# Patient Record
Sex: FEMALE | Race: WHITE | Employment: UNEMPLOYED | URBAN - METROPOLITAN AREA
[De-identification: names, ages, dates, MRNs, and addresses within clinical notes are randomized per-mention and may not be internally consistent; named-entity substitution may affect disease eponyms.]

---

## 2017-01-05 ENCOUNTER — TELEPHONE (OUTPATIENT)
Dept: INTERNAL MEDICINE CLINIC | Age: 57
End: 2017-01-05

## 2017-01-05 RX ORDER — AZITHROMYCIN 250 MG/1
250 TABLET, FILM COATED ORAL SEE ADMIN INSTRUCTIONS
Qty: 6 TAB | Refills: 0 | Status: SHIPPED | OUTPATIENT
Start: 2017-01-05 | End: 2017-01-10

## 2017-01-05 NOTE — TELEPHONE ENCOUNTER
Spoke with pt - states has hx of pneumonia. Seen at [facility] on 12/30/16 - CXR done - dx bronchitis and given Rx. She states she continues with \"dry cough, congestion and had fever 100.3 this am.\" No fever now. States given Omnicef - but does not want to take - wants Dr Nelson Weber to prescribe something for her.  Will forward to MD.

## 2017-01-17 ENCOUNTER — HOSPITAL ENCOUNTER (OUTPATIENT)
Dept: LAB | Age: 57
Discharge: HOME | End: 2017-01-17
Payer: MEDICARE

## 2017-01-17 ENCOUNTER — OFFICE VISIT (OUTPATIENT)
Dept: INTERNAL MEDICINE CLINIC | Age: 57
End: 2017-01-17

## 2017-01-17 VITALS
HEART RATE: 65 BPM | TEMPERATURE: 97.4 F | RESPIRATION RATE: 18 BRPM | SYSTOLIC BLOOD PRESSURE: 110 MMHG | OXYGEN SATURATION: 100 % | BODY MASS INDEX: 24.14 KG/M2 | WEIGHT: 168.6 LBS | DIASTOLIC BLOOD PRESSURE: 80 MMHG | HEIGHT: 70 IN

## 2017-01-17 DIAGNOSIS — J32.9 REFRACTORY SINUSITIS: Primary | ICD-10-CM

## 2017-01-17 DIAGNOSIS — J41.1 PURULENT BRONCHITIS (HCC): ICD-10-CM

## 2017-01-17 PROCEDURE — 87077 CULTURE AEROBIC IDENTIFY: CPT

## 2017-01-17 PROCEDURE — 87070 CULTURE OTHR SPECIMN AEROBIC: CPT

## 2017-01-17 RX ORDER — PREDNISONE 10 MG/1
TABLET ORAL
Qty: 25 TAB | Refills: 0 | Status: SHIPPED | OUTPATIENT
Start: 2017-01-17 | End: 2017-03-07 | Stop reason: ALTCHOICE

## 2017-01-17 RX ORDER — CEFUROXIME AXETIL 500 MG/1
500 TABLET ORAL 2 TIMES DAILY
Qty: 42 TAB | Refills: 0 | Status: SHIPPED | OUTPATIENT
Start: 2017-01-17 | End: 2017-02-07

## 2017-01-17 NOTE — MR AVS SNAPSHOT
Visit Information Date & Time Provider Department Dept. Phone Encounter #  
 1/17/2017 11:15 AM Dixie Garcia MD [address] [address] Internal Medicine [phone] [id number] Follow-up Instructions Return in about 3 weeks (around 2/7/2017). Upcoming Health Maintenance Date Due DTaP/Tdap/Td series (1 - Tdap) 2/14/1982 PAP AKA CERVICAL CYTOLOGY 2/14/1982 BREAST CANCER SCRN MAMMOGRAM 10/11/2018 COLONOSCOPY 11/14/2021 Allergies as of 1/17/2017  Review Complete On: 1/17/2017 By: Dixie Garcia MD  
  
 Severity Noted Reaction Type Reactions Aspirin High 07/22/2010    Other (comments) Has had gastric bypass, and three brain surgeries. Ibuprofen High 07/22/2010    Other (comments) Gastric bypass surgeon advised not to take . Hydrocodone  07/22/2010    Other (comments) Not allergic Percocet [Oxycodone-acetaminophen]  07/26/2010    Other (comments) Not allergic Current Immunizations  Reviewed on 11/9/2016 Name Date Influenza Nasal Vaccine 10/3/2015 Influenza Vaccine 9/9/2016, 9/30/2015, 10/31/2013 Pneumococcal Conjugate (PCV-13) 11/8/2016 Pneumococcal Polysaccharide (PPSV-23) 10/31/2015 Not reviewed this visit You Were Diagnosed With   
  
 Codes Comments Refractory sinusitis    -  Primary ICD-10-CM: J32.9 ICD-9-CM: 473.9 Purulent [facility] ([facility]     ICD-10-CM: J41.1 ICD-9-CM: 491.1 Vitals BP Pulse Temp Resp Height(growth percentile) Weight(growth percentile) 110/80 (BP 1 Location: Right arm, BP Patient Position: Sitting) 65 97.4 °F (36.3 °C) (Oral) 18 5' 10\" (1.778 m) 168 lb 9.6 oz (76.5 kg) SpO2 BMI OB Status Smoking Status 100% 24.19 kg/m2 Hysterectomy Current Every Day Smoker BMI and BSA Data Body Mass Index Body Surface Area  
 24.19 kg/m 2 1.94 m 2 Preferred Pharmacy Pharmacy Name Phone  [facility] [address] [address] [phone] Your Updated Medication List  
  
   
This list is accurate as of: 1/17/17 12:13 PM.  Always use your most recent med list.  
  
  
  
  
 albuterol 90 mcg/actuation inhaler Commonly known as:  PROVENTIL HFA, VENTOLIN HFA, PROAIR HFA Take 2 Puffs by inhalation every six (6) hours as needed for Wheezing. buPROPion 75 mg tablet Commonly known as:  [facility] Take  by mouth two (2) times a day. butalbital-acetaminophen-caffeine -40 mg per tablet Commonly known as:  FIORICET, ESGIC  
TAKE 1 TABLET BY MOUTH EVERY 4 HOURS AS NEEDED FOR PAIN  
  
 cefUROXime 500 mg tablet Commonly known as:  CEFTIN Take 1 Tab by mouth two (2) times a day for 21 days. clonazePAM 1 mg tablet Commonly known as:  Natalie Figueroa Take 1 mg by mouth two (2) times a day. divalproex  mg tablet Commonly known as:  DEPAKOTE Take 1 Tab by mouth four (4) times daily. FLUoxetine 40 mg capsule Commonly known as:  PROzac Take 80 mg by mouth daily. HYDROcodone-acetaminophen 5-325 mg per tablet Commonly known as:  Sandi Arts Take 1 Tab by mouth every four (4) hours as needed for Pain. Max Daily Amount: 6 Tabs. meloxicam 15 mg tablet Commonly known as:  MOBIC Take 1 tablet by mouth  daily. Replaces Ibuprofen. multivitamin tablet Commonly known as:  ONE A DAY Take 1 Tab by mouth daily. predniSONE 10 mg tablet Commonly known as:  Klever Faustinor See attached PROBIOTIC BLEND PO Take 1 Cap by mouth daily. promethazine 25 mg tablet Commonly known as:  PHENERGAN Take 1 tablet by mouth  every 8 hours as needed for nausea SEROquel 300 mg tablet Generic drug:  QUEtiapine Take 300 mg by mouth nightly. Prescriptions Sent to Pharmacy Refills  
 cefUROXime (CEFTIN) 500 mg tablet 0 Sig: Take 1 Tab by mouth two (2) times a day for 21 days.   
 Class: Normal  
 [facility]: [id number] [address] Ph #: [phone] Route: Oral  
 predniSONE (DELTASONE) 10 mg tablet 0 Sig: See attached Class: Normal  
 Pharmacy: [id number] [address] Ph #: [phone] We Performed the Following CULTURE, RESPIRATORY/SPUTUM/BRONCH Yamilka Shall STAIN [43687 CPT(R)] Follow-up Instructions Return in about 3 weeks (around 2/7/2017). Introducing [facility]! Luisa Haynes introduces [employer] patient portal. Now you can access parts of your medical record, email your doctor's office, and request medication refills online. 1. In your internet browser, go to [email or web address] 2. Click on the First Time User? Click Here link in the Sign In box. You will see the New Member Sign Up page. 3. Enter your Hello Music Access Code exactly as it appears below. You will not need to use this code after youve completed the sign-up process. If you do not sign up before the expiration date, you must request a new code. · Hello Music Access Code: [email or web address] Expires: 3/28/2017  8:32 AM 
 
4. Enter the last four digits of your Social Security Number (xxxx) and Date of Birth (mm/dd/yyyy) as indicated and click Submit. You will be taken to the next sign-up page. 5. Create a RADSONEt ID. This will be your Hello Music login ID and cannot be changed, so think of one that is secure and easy to remember. 6. Create a Hello Music password. You can change your password at any time. 7. Enter your Password Reset Question and Answer. This can be used at a later time if you forget your password. 8. Enter your e-mail address. You will receive e-mail notification when new information is available in [address]. 9. Click Sign Up. You can now view and download portions of your medical record.  
10. Click the Download Summary menu link to download a portable copy of your medical information. If you have questions, please visit the Frequently Asked Questions section of the Attensa website. Remember, Attensa is NOT to be used for urgent needs. For medical emergencies, dial 911. Now available from your iPhone and Android! Please provide this summary of care documentation to your next provider. Your primary care clinician is listed as STACEY LOUIS. If you have any questions after today's visit, please call [phone].

## 2017-01-18 NOTE — PROGRESS NOTES
HISTORY OF PRESENT ILLNESS  Chace Pierce is a 55 y.o. female. URI    The history is provided by the patient and parent (went to urgent care, notes reviewed). This is a new problem. The current episode started more than 1 week ago (had pneumonia in October, feels she has never quite resolved with her sx. repeat cxr shows resolved pneumonia). The problem has not changed since onset. There has been no fever. Associated symptoms include congestion, ear pain, plugged ear sensation, sore throat, cough and wheezing. Pertinent negatives include no abdominal pain, no diarrhea, no nausea and no vomiting. Treatments tried: omnicef. The treatment provided no relief. Review of Systems   HENT: Positive for congestion, ear pain and sore throat. Respiratory: Positive for cough and wheezing. Gastrointestinal: Negative for abdominal pain, diarrhea, nausea and vomiting. Physical Exam   Constitutional: No distress. HENT:   Right Ear: Tympanic membrane and ear canal normal.   Left Ear: Tympanic membrane and ear canal normal.   Nose: Right sinus exhibits no maxillary sinus tenderness and no frontal sinus tenderness. Left sinus exhibits no maxillary sinus tenderness and no frontal sinus tenderness. Mouth/Throat: Posterior oropharyngeal edema and posterior oropharyngeal erythema present. No oropharyngeal exudate. Eyes: Conjunctivae are normal.   Neck: Neck supple. Cardiovascular: Normal rate and regular rhythm. Exam reveals no gallop and no friction rub. No murmur heard. Pulmonary/Chest: Effort normal. She has wheezes. mild   Lymphadenopathy:     She has no cervical adenopathy. Nursing note and vitals reviewed. ASSESSMENT and PLAN  Kurt Fernandez was seen today for cough, ear pain and nasal congestion. Diagnoses and all orders for this visit:    Refractory sinusitis  -     cefUROXime (CEFTIN) 500 mg tablet; Take 1 Tab by mouth two (2) times a day for 21 days.   -     predniSONE (DELTASONE) 10 mg tablet; See attached- Reviewed side effects, goal of treatment and need for follow up  . Specifically warned against worsened psych sx, call asap if noted  -     CULTURE, RESPIRATORY/SPUTUM/BRONCH W GRAM STAIN   - consider ENT consult if sx persist    Purulent bronchitis (HCC)  -     cefUROXime (CEFTIN) 500 mg tablet; Take 1 Tab by mouth two (2) times a day for 21 days.   -     predniSONE (DELTASONE) 10 mg tablet; See attached  -     CULTURE, RESPIRATORY/SPUTUM/BRONCH [address] was reviewed with patient and family, understanding expressed

## 2017-01-21 LAB
BACTERIA SPT CULT: ABNORMAL
BACTERIA SPT CULT: ABNORMAL

## 2017-03-07 ENCOUNTER — OFFICE VISIT (OUTPATIENT)
Dept: INTERNAL MEDICINE CLINIC | Age: 57
End: 2017-03-07

## 2017-03-07 VITALS
HEIGHT: 70 IN | WEIGHT: 169.4 LBS | SYSTOLIC BLOOD PRESSURE: 110 MMHG | HEART RATE: 82 BPM | BODY MASS INDEX: 24.25 KG/M2 | RESPIRATION RATE: 16 BRPM | TEMPERATURE: 98.1 F | DIASTOLIC BLOOD PRESSURE: 80 MMHG | OXYGEN SATURATION: 96 %

## 2017-03-07 DIAGNOSIS — Z23 NEED FOR TDAP VACCINATION: ICD-10-CM

## 2017-03-07 DIAGNOSIS — J32.4 CHRONIC PANSINUSITIS: Primary | ICD-10-CM

## 2017-05-16 RX ORDER — PROMETHAZINE HYDROCHLORIDE 25 MG/1
TABLET ORAL
Qty: 270 TAB | Refills: 3 | Status: SHIPPED | COMMUNITY
Start: 2017-05-16 | End: 2018-04-13 | Stop reason: SDUPTHER

## 2017-05-18 RX ORDER — MELOXICAM 15 MG/1
TABLET ORAL
Qty: 90 TAB | Refills: 1 | Status: SHIPPED | COMMUNITY
Start: 2017-05-18 | End: 2017-09-30 | Stop reason: SDUPTHER

## 2017-06-06 ENCOUNTER — HOSPITAL ENCOUNTER (EMERGENCY)
Age: 57
Discharge: HOME | End: 2017-06-06
Attending: FAMILY MEDICINE

## 2017-06-06 ENCOUNTER — HOSPITAL ENCOUNTER (OUTPATIENT)
Dept: LAB | Age: 57
Discharge: HOME | End: 2017-06-06

## 2017-06-06 VITALS
BODY MASS INDEX: 25.05 KG/M2 | TEMPERATURE: 97.8 F | HEART RATE: 69 BPM | WEIGHT: 175 LBS | SYSTOLIC BLOOD PRESSURE: 129 MMHG | HEIGHT: 70 IN | DIASTOLIC BLOOD PRESSURE: 62 MMHG | OXYGEN SATURATION: 95 % | RESPIRATION RATE: 16 BRPM

## 2017-06-06 DIAGNOSIS — J02.9 PHARYNGITIS, UNSPECIFIED ETIOLOGY: Primary | ICD-10-CM

## 2017-06-06 LAB — S PYO AG THROAT QL: NEGATIVE

## 2017-06-06 PROCEDURE — 87070 CULTURE OTHR SPECIMN AEROBIC: CPT | Performed by: PHYSICIAN ASSISTANT

## 2017-06-06 RX ORDER — PREDNISONE 20 MG/1
20 TABLET ORAL DAILY
Qty: 10 TAB | Refills: 0 | Status: SHIPPED | OUTPATIENT
Start: 2017-06-06 | End: 2017-06-16

## 2017-06-06 RX ORDER — LIDOCAINE HYDROCHLORIDE 20 MG/ML
SOLUTION OROPHARYNGEAL
Qty: 1 BOTTLE | Refills: 0 | Status: SHIPPED | OUTPATIENT
Start: 2017-06-06 | End: 2017-09-01 | Stop reason: ALTCHOICE

## 2017-06-06 NOTE — DISCHARGE INSTRUCTIONS
Sore Throat: Care Instructions  Your Care Instructions    Infection by bacteria or a virus causes most sore throats. Cigarette smoke, dry air, air pollution, allergies, and yelling can also cause a sore throat. Sore throats can be painful and annoying. Fortunately, most sore throats go away on their own. If you have a bacterial infection, your doctor may prescribe antibiotics. Follow-up care is a key part of your treatment and safety. Be sure to make and go to all appointments, and call your doctor if you are having problems. It's also a good idea to know your test results and keep a list of the medicines you take. How can you care for yourself at home? · If your doctor prescribed antibiotics, take them as directed. Do not stop taking them just because you feel better. You need to take the full course of antibiotics. · Gargle with warm salt water once an hour to help reduce swelling and relieve discomfort. Use 1 teaspoon of salt mixed in 1 cup of warm water. · Take an over-the-counter pain medicine, such as acetaminophen (Tylenol), ibuprofen (Advil, Motrin), or naproxen (Aleve). Read and follow all instructions on the label. · Be careful when taking over-the-counter cold or flu medicines and Tylenol at the same time. Many of these medicines have acetaminophen, which is Tylenol. Read the labels to make sure that you are not taking more than the recommended dose. Too much acetaminophen (Tylenol) can be harmful. · Drink plenty of fluids. Fluids may help soothe an irritated throat. Hot fluids, such as tea or soup, may help decrease throat pain. · Use over-the-counter throat lozenges to soothe pain. Regular cough drops or hard candy may also help. These should not be given to young children because of the risk of choking. · Do not smoke or allow others to smoke around you. If you need help quitting, talk to your doctor about stop-smoking programs and medicines.  These can increase your chances of quitting for good. · Use a vaporizer or humidifier to add moisture to your bedroom. Follow the directions for cleaning the machine. When should you call for help? Call your doctor now or seek immediate medical care if:  · You have new or worse trouble swallowing. · Your sore throat gets much worse on one side. Watch closely for changes in your health, and be sure to contact your doctor if you do not get better as expected. Where can you learn more? Go to http://jorje-mila.info/. Enter [phone] in the search box to learn more about \"Sore Throat: Care Instructions. \"  Current as of: July 29, 2016  Content Version: 11.2  © 6385-6612 [employer]. Care instructions adapted under license by [employer] (which disclaims liability or warranty for this information). If you have questions about a medical condition or this instruction, always ask your healthcare professional. [address] any warranty or liability for your use of this information.

## 2017-06-07 NOTE — UC PROVIDER NOTE
Patient is a 65 y.o. female presenting with sore throat. The history is provided by the patient. Sore Throat    This is a new problem. The current episode started yesterday. The problem has not changed since onset. There has been no fever. Pertinent negatives include no diarrhea, no vomiting, no congestion, no drooling, no ear discharge, no ear pain, no headaches, no shortness of breath, no stridor and no swollen glands. She has had no exposure to strep or mono. She has tried nothing for the symptoms. Past Medical History:   Diagnosis Date    Anxiety disorder     Brain aneurysm 2/12/2010    Headache 2/12/2010    Hyperlipidemia 2/12/2010    Insomnia 2/12/2010    LFT'S ABNORMAL 2/12/2010    Neurological disorder     Brain anuruysm    Screen for colon cancer 11/13/2014    Seizures ([address]         Past Surgical History:   Procedure Laterality Date    HX CHOLECYSTECTOMY      HX COLONOSCOPY  2015    due 26    HX CRANIOTOMY      And cerebral shunt, by Dr. Cricket Nayak. Followed by Dr. Roni Chavez.  HX HERNIA REPAIR      HX PARTIAL HYSTERECTOMY           Family History   Problem Relation Age of Onset    Diabetes Mother     Cancer Father     Cancer Maternal Grandmother     Heart Disease Maternal Grandfather         Social History     Social History    Marital status:      Spouse name: N/A    Number of children: N/A    Years of education: N/A     Occupational History    Not on file. Social History Main Topics    Smoking status: Current Every Day Smoker     Years: 30.00     Types: Cigarettes    Smokeless tobacco: Not on file      Comment: a few cigarettes daily    Alcohol use No    Drug use: No    Sexual activity: Not on file     Other Topics Concern    Not on file     Social History Narrative                ALLERGIES: Aspirin; Ibuprofen; Hydrocodone; and Percocet [oxycodone-acetaminophen]    Review of Systems   Constitutional: Negative for chills and fever.    HENT: Positive for sore throat. Negative for congestion, drooling, ear discharge and ear pain. Respiratory: Negative for shortness of breath and stridor. Gastrointestinal: Negative for diarrhea and vomiting. Neurological: Negative for headaches. Vitals:    06/06/17 1407   BP: 129/62   Pulse: 69   Resp: 16   Temp: 97.8 °F (36.6 °C)   SpO2: 95%   Weight: 79.4 kg (175 lb)   Height: 5' 10\" (1.778 m)       Physical Exam   Constitutional: She is oriented to person, place, and time. She appears well-developed and well-nourished. HENT:   Right Ear: External ear normal.   Left Ear: External ear normal.   Mouth/Throat: Oropharynx is clear and moist.   Eyes: Conjunctivae and EOM are normal.   Cardiovascular: Normal rate, regular rhythm and normal heart sounds. Pulmonary/Chest: Effort normal and breath sounds normal.   Neurological: She is alert and oriented to person, place, and time. Skin: Skin is warm and dry. Psychiatric: She has a normal mood and affect. Her behavior is normal. Judgment and thought content normal.   Nursing note and vitals reviewed. MDM     Differential Diagnosis; Clinical Impression; Plan:     CLINICAL IMPRESSION:  Pharyngitis, unspecified etiology  (primary encounter diagnosis)    Plan:  1. Lidocaine viscous  2. prednisone  3. Amount and/or Complexity of Data Reviewed:   Clinical lab tests:  Ordered and reviewed  Risk of Significant Complications, Morbidity, and/or Mortality:   Presenting problems: Moderate  Diagnostic procedures: Moderate  Management options:   Moderate  Progress:   Patient progress:  Stable      Procedures

## 2017-06-08 LAB
BACTERIA SPEC CULT: NORMAL
SERVICE CMNT-IMP: NORMAL

## 2017-08-31 ENCOUNTER — TELEPHONE (OUTPATIENT)
Dept: INTERNAL MEDICINE CLINIC | Age: 57
End: 2017-08-31

## 2017-08-31 NOTE — TELEPHONE ENCOUNTER
Spoke with pt - states she was given prednisone at Urgent Care for cold/sore throat symptoms. She states had a reaction to med. Her reaction was she could not walk or talk after one dose. No other reaction such as rash or sob. Asked her when she was given this - states few weeks ago. While reviewing chart - pt was seen back on 6/6/17. Advised her I would let MD know. Will forward to MD.    Pt states she got her flu shot 2 days ago at pharmacy.

## 2017-08-31 NOTE — TELEPHONE ENCOUNTER
The patient thinks she had a reaction to Prednisone and does not want to ever take the medication again also she would like to see if she should get her flu shot soon.  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

## 2017-09-01 ENCOUNTER — OFFICE VISIT (OUTPATIENT)
Dept: INTERNAL MEDICINE CLINIC | Age: 57
End: 2017-09-01

## 2017-09-01 ENCOUNTER — HOSPITAL ENCOUNTER (OUTPATIENT)
Dept: LAB | Age: 57
Discharge: HOME | End: 2017-09-01
Payer: MEDICARE

## 2017-09-01 VITALS
SYSTOLIC BLOOD PRESSURE: 108 MMHG | HEART RATE: 70 BPM | TEMPERATURE: 97.7 F | OXYGEN SATURATION: 94 % | BODY MASS INDEX: 23.22 KG/M2 | HEIGHT: 70 IN | RESPIRATION RATE: 16 BRPM | DIASTOLIC BLOOD PRESSURE: 65 MMHG | WEIGHT: 162.2 LBS

## 2017-09-01 DIAGNOSIS — Z13.0 SCREENING FOR ENDOCRINE, METABOLIC, AND IMMUNITY DISORDER: ICD-10-CM

## 2017-09-01 DIAGNOSIS — G44.229 CHRONIC TENSION-TYPE HEADACHE, NOT INTRACTABLE: Primary | ICD-10-CM

## 2017-09-01 DIAGNOSIS — F33.42 RECURRENT MAJOR DEPRESSIVE DISORDER, IN FULL REMISSION (HCC): ICD-10-CM

## 2017-09-01 DIAGNOSIS — G40.909 SEIZURE DISORDER (HCC): ICD-10-CM

## 2017-09-01 DIAGNOSIS — Z13.228 SCREENING FOR ENDOCRINE, METABOLIC, AND IMMUNITY DISORDER: ICD-10-CM

## 2017-09-01 DIAGNOSIS — Z13.29 SCREENING FOR ENDOCRINE, METABOLIC, AND IMMUNITY DISORDER: ICD-10-CM

## 2017-09-01 DIAGNOSIS — E55.9 VITAMIN D DEFICIENCY: ICD-10-CM

## 2017-09-01 PROCEDURE — 82550 ASSAY OF CK (CPK): CPT

## 2017-09-01 PROCEDURE — 82306 VITAMIN D 25 HYDROXY: CPT

## 2017-09-01 PROCEDURE — 80053 COMPREHEN METABOLIC PANEL: CPT

## 2017-09-01 PROCEDURE — 85025 COMPLETE CBC W/AUTO DIFF WBC: CPT

## 2017-09-01 PROCEDURE — 84443 ASSAY THYROID STIM HORMONE: CPT

## 2017-09-01 PROCEDURE — 82977 ASSAY OF GGT: CPT

## 2017-09-01 PROCEDURE — 36415 COLL VENOUS BLD VENIPUNCTURE: CPT

## 2017-09-01 PROCEDURE — 86787 VARICELLA-ZOSTER ANTIBODY: CPT

## 2017-09-01 NOTE — PROGRESS NOTES
Chief Complaint   Patient presents with    Head Pain     1. Have you been to the ER, urgent care clinic since your last visit? Hospitalized since your last visit? No    2. Have you seen or consulted any other health care providers outside of the [address] [place] [address] since your last visit? Include any pap smears or colon screening.  No

## 2017-09-01 NOTE — MR AVS SNAPSHOT
Visit Information Date & Time Provider Department Dept. Phone Encounter #  
 9/1/2017  1:30 PM Nora Andrade, [address] [place] [address] Internal Medicine [phone] [id number] Upcoming Health Maintenance Date Due DTaP/Tdap/Td series (1 - Tdap) 2/14/1982 BREAST CANCER SCRN MAMMOGRAM 10/11/2018 PAP AKA CERVICAL CYTOLOGY 3/7/2020 COLONOSCOPY 11/14/2021 Allergies as of 9/1/2017  Review Complete On: 9/1/2017 By: Nora Andrade MD  
  
 Severity Noted Reaction Type Reactions Aspirin High 07/22/2010    Other (comments) Has had gastric bypass, and three brain surgeries. Ibuprofen High 07/22/2010    Other (comments) Gastric bypass surgeon advised not to take . Hydrocodone  07/22/2010    Other (comments) Not allergic Percocet [Oxycodone-acetaminophen]  07/26/2010    Other (comments) Not allergic Prednisone  09/01/2017    Other (comments) Equilibrium off Current Immunizations  Reviewed on 8/28/2017 Name Date Influenza Nasal Vaccine 10/3/2015 Influenza Vaccine 8/25/2017, 9/9/2016, 9/30/2015, 10/31/2013 Pneumococcal Conjugate (PCV-13) 11/8/2016 Pneumococcal Polysaccharide (PPSV-23) 10/31/2015 Not reviewed this visit You Were Diagnosed With   
  
 Codes Comments Chronic tension-type headache, not intractable    -  Primary ICD-10-CM: I29.557 ICD-9-CM: 339.12 [facility] ([facility]     ICD-10-CM: G40.909 ICD-9-CM: 345.90 Recurrent major depressive disorder, in full remission ([facility]     ICD-10-CM: F33.42 
ICD-9-CM: 296.36 Vitamin D deficiency     ICD-10-CM: E55.9 ICD-9-CM: 268.9 Screening for endocrine, metabolic, and immunity disorder     ICD-10-CM: Z13.29, Z13.228, Z13.0 ICD-9-CM: V77.99 Vitals BP Pulse Temp Resp Height(growth percentile) Weight(growth percentile) 108/65 (BP 1 Location: Right arm, BP Patient Position: Sitting) 70 97.7 °F (36.5 °C) (Oral) 16 5' 10\" (1.778 m) 162 lb 3.2 oz (73.6 kg) SpO2 BMI OB Status Smoking Status 94% 23.27 kg/m2 Hysterectomy Current Every Day Smoker Vitals History BMI and BSA Data Body Mass Index Body Surface Area  
 23.27 kg/m 2 1.91 m 2 Preferred Pharmacy Pharmacy Name Phone [facility] [phone] Your Updated Medication List  
  
   
This list is accurate as of: 9/1/17  2:11 PM.  Always use your most recent med list.  
  
  
  
  
 buPROPion 75 mg tablet Commonly known as:  [facility] Take  by mouth two (2) times a day. butalbital-acetaminophen-caffeine -40 mg per tablet Commonly known as:  FIORICET, ESGIC  
TAKE 1 TABLET BY MOUTH EVERY 4 HOURS AS NEEDED FOR PAIN  
  
 clonazePAM 1 mg tablet Commonly known as:  Terrence Ben Take 1 mg by mouth two (2) times a day. divalproex  mg tablet Commonly known as:  DEPAKOTE Take 1 Tab by mouth four (4) times daily. FLUoxetine 40 mg capsule Commonly known as:  PROzac Take 80 mg by mouth daily. meloxicam 15 mg tablet Commonly known as:  MOBIC  
TAKE 1 TABLET BY MOUTH  DAILY (REPLACES IBUPROFEN)  
  
 multivitamin tablet Commonly known as:  ONE A DAY Take 1 Tab by mouth daily. PROBIOTIC BLEND PO Take 1 Cap by mouth daily. promethazine 25 mg tablet Commonly known as:  PHENERGAN Take 1 tablet by mouth  every 8 hours as needed for nausea SEROquel 300 mg tablet Generic drug:  QUEtiapine Take 400 mg by mouth nightly. We Performed the Following CBC WITH AUTOMATED DIFF [33508 CPT(R)] METABOLIC PANEL, COMPREHENSIVE [78881 CPT(R)] TSH 3RD GENERATION [56731 CPT(R)] VITAMIN D, 25 HYDROXY [id number] CPT(R)] VZV AB, IGG [id number] CPT(R)] Introducing [facility]! Ofelia Rowland introduces [employer] patient portal. Now you can access parts of your medical record, email your doctor's office, and request medication refills online. 1. In your internet browser, go to [email or web address] 2. Click on the First Time User? Click Here link in the Sign In box. You will see the New Member Sign Up page. 3. Enter your BorderJump Access Code exactly as it appears below. You will not need to use this code after youve completed the sign-up process. If you do not sign up before the expiration date, you must request a new code. · BorderJump Access Code: [email or web address] Expires: 9/4/2017  2:30 PM 
 
4. Enter the last four digits of your Social Security Number (xxxx) and Date of Birth (mm/dd/yyyy) as indicated and click Submit. You will be taken to the next sign-up page. 5. Create a Nitrot ID. This will be your BorderJump login ID and cannot be changed, so think of one that is secure and easy to remember. 6. Create a BorderJump password. You can change your password at any time. 7. Enter your Password Reset Question and Answer. This can be used at a later time if you forget your password. 8. Enter your e-mail address. You will receive e-mail notification when new information is available in [address] 9. Click Sign Up. You can now view and download portions of your medical record. 10. Click the Download Summary menu link to download a portable copy of your medical information. If you have questions, please visit the Frequently Asked Questions section of the BorderJump website. Remember, BorderJump is NOT to be used for urgent needs. For medical emergencies, dial 911. Now available from your iPhone and Android! Please provide this summary of care documentation to your next provider. Your primary care clinician is listed as STACEY LOUIS. If you have any questions after today's visit, please call [phone].

## 2017-09-01 NOTE — PROGRESS NOTES
HISTORY OF PRESENT ILLNESS  Ruben Quigley is a 65 y.o. female. ROLA Neftali Mathur is seen today for routine follow up of anemia and other chronic problems. Seizure disorder with history of intracerebral hemorrhage. She is up to date with specialist follow up, clinically stable. Vitamin D deficiency, anemia. Due for routine labs. Anxiety. Stable with psychiatry follow up. Chronic nausea. She uses Zofran periodically with good results. Preventive medicine. She had declined Zostavax secondary to this expense. MedDATA/gwo     Past Medical History:   Diagnosis Date    Anxiety disorder     Brain aneurysm 2/12/2010    Headache 2/12/2010    Hyperlipidemia 2/12/2010    Insomnia 2/12/2010    LFT'S ABNORMAL 2/12/2010    Neurological disorder     Brain anuruysm    Screen for colon cancer 11/13/2014    Seizures ([facility]          Review of Systems   Constitutional: Negative for weight loss. Respiratory: Negative. Cardiovascular: Negative for chest pain, palpitations, leg swelling and PND. Musculoskeletal: Negative for myalgias. Neurological: Positive for speech change. Negative for focal weakness and seizures. Psychiatric/Behavioral: Positive for depression. The patient is nervous/anxious. Physical Exam   Constitutional: She appears well-nourished. Neck: Carotid bruit is not present. Cardiovascular: Normal rate and regular rhythm. Exam reveals no gallop and no friction rub. No murmur heard. Pulmonary/Chest: Effort normal and breath sounds normal. No respiratory distress. Musculoskeletal: She exhibits no edema. Nursing note and vitals reviewed. ASSESSMENT and PLAN  Diagnoses and all orders for this visit:    1. Chronic tension-type headache, not intractable- Continue current regimen of prescription and / or OTC medications , See neurologist as directed     2. Seizure disorder ([facility]  -     CBC WITH AUTOMATED DIFF  -     METABOLIC PANEL, COMPREHENSIVE    3.  Recurrent major depressive disorder, in full remission ([facility]  -     TSH 3RD GENERATION    4.  Vitamin D deficiency  -     VITAMIN D, 25 HYDROXY    5. Screening for endocrine, metabolic, and immunity disorder  -     VZV AB, IGG

## 2017-09-02 LAB
25(OH)D3+25(OH)D2 SERPL-MCNC: 20.1 NG/ML (ref 30–100)
ALBUMIN SERPL-MCNC: 4.5 G/DL (ref 3.5–5.5)
ALBUMIN/GLOB SERPL: 1.5 {RATIO} (ref 1.2–2.2)
ALP SERPL-CCNC: 169 IU/L (ref 39–117)
ALT SERPL-CCNC: 22 IU/L (ref 0–32)
AST SERPL-CCNC: 47 IU/L (ref 0–40)
BASOPHILS # BLD AUTO: 0 X10E3/UL (ref 0–0.2)
BASOPHILS NFR BLD AUTO: 0 %
BILIRUB SERPL-MCNC: 0.5 MG/DL (ref 0–1.2)
BUN SERPL-MCNC: 16 MG/DL (ref 6–24)
BUN/CREAT SERPL: 18 (ref 9–23)
CALCIUM SERPL-MCNC: 9.6 MG/DL (ref 8.7–10.2)
CHLORIDE SERPL-SCNC: 95 MMOL/L (ref 96–106)
CO2 SERPL-SCNC: 27 MMOL/L (ref 18–29)
CREAT SERPL-MCNC: 0.87 MG/DL (ref 0.57–1)
EOSINOPHIL # BLD AUTO: 0.4 X10E3/UL (ref 0–0.4)
EOSINOPHIL NFR BLD AUTO: 3 %
ERYTHROCYTE [DISTWIDTH] IN BLOOD BY AUTOMATED COUNT: 15.1 % (ref 12.3–15.4)
GLOBULIN SER CALC-MCNC: 3 G/DL (ref 1.5–4.5)
GLUCOSE SERPL-MCNC: 68 MG/DL (ref 65–99)
HCT VFR BLD AUTO: 41.9 % (ref 34–46.6)
HGB BLD-MCNC: 13.3 G/DL (ref 11.1–15.9)
IMM GRANULOCYTES # BLD: 0 X10E3/UL (ref 0–0.1)
IMM GRANULOCYTES NFR BLD: 0 %
LYMPHOCYTES # BLD AUTO: 3.6 X10E3/UL (ref 0.7–3.1)
LYMPHOCYTES NFR BLD AUTO: 26 %
MCH RBC QN AUTO: 29.9 PG (ref 26.6–33)
MCHC RBC AUTO-ENTMCNC: 31.7 G/DL (ref 31.5–35.7)
MCV RBC AUTO: 94 FL (ref 79–97)
MONOCYTES # BLD AUTO: 1.1 X10E3/UL (ref 0.1–0.9)
MONOCYTES NFR BLD AUTO: 8 %
NEUTROPHILS # BLD AUTO: 8.6 X10E3/UL (ref 1.4–7)
NEUTROPHILS NFR BLD AUTO: 63 %
PLATELET # BLD AUTO: 239 X10E3/UL (ref 150–379)
POTASSIUM SERPL-SCNC: 4.2 MMOL/L (ref 3.5–5.2)
PROT SERPL-MCNC: 7.5 G/DL (ref 6–8.5)
RBC # BLD AUTO: 4.45 X10E6/UL (ref 3.77–5.28)
SODIUM SERPL-SCNC: 138 MMOL/L (ref 134–144)
TSH SERPL DL<=0.005 MIU/L-ACNC: 3.26 UIU/ML (ref 0.45–4.5)
VZV IGG SER IA-ACNC: 1180 INDEX
WBC # BLD AUTO: 13.8 X10E3/UL (ref 3.4–10.8)

## 2017-09-05 NOTE — PROGRESS NOTES
[facility] spoke with Misbah Yan - added on ggt and cpk - Dx - abnormal transaminase enzymes (R74.0).

## 2017-09-06 LAB
CK SERPL-CCNC: 676 U/L (ref 24–173)
GGT SERPL-CCNC: 605 IU/L (ref 0–60)
SPECIMEN STATUS REPORT, ROLRST: NORMAL

## 2017-09-09 NOTE — PROGRESS NOTES
Call pt- her lfts are abnormal. Which specialist prescribes her depakote ? Once you know please get them on the phone. Also order abdominal us, dx increase lfts  Lastly, Vitamin d is slightly low. Start vitamin d 2000 units every day, available over the counter.

## 2017-09-11 DIAGNOSIS — R79.89 ELEVATED LFTS: Primary | ICD-10-CM

## 2017-09-11 NOTE — PROGRESS NOTES
Advised pt her lfts are abnormal. MD recommends she get an abdominal US. Scheduled her tomorrow 9/12/17 at 10:45 am. Pt is aware of appt location and to arrive by 10:15 am.  Her vitamin d is slightly low. Need to start vitamin d 2000 units every day - available over the counter. Spoke with [employer] scheduling for appt time.

## 2017-09-11 NOTE — PROGRESS NOTES
Advised pt MD needed to know specialist that prescribes her Depakote? She states it is Dr Enoch Roman at [facility]. Called Dr Tate\"s office - spoke with Carolyn Griffiths - MD not available - she will send message to him to call Dr Loy Martinez.  Will forward to MD.

## 2017-09-12 ENCOUNTER — HOSPITAL ENCOUNTER (OUTPATIENT)
Dept: ULTRASOUND IMAGING | Age: 57
Discharge: HOME | End: 2017-09-12
Attending: INTERNAL MEDICINE
Payer: MEDICARE

## 2017-09-12 DIAGNOSIS — R79.89 ELEVATED LFTS: ICD-10-CM

## 2017-09-12 PROCEDURE — 76700 US EXAM ABDOM COMPLETE: CPT

## 2017-09-13 ENCOUNTER — TELEPHONE (OUTPATIENT)
Dept: INTERNAL MEDICINE CLINIC | Age: 57
End: 2017-09-13

## 2017-09-13 NOTE — TELEPHONE ENCOUNTER
Reviewed case, increase lft, ? Relating to depakote. Advised anam lab. If still abnormal change meds. If no Keppra in past, change to 1 gr bid, taper depakote over one month. If severe elevation accelerate taper.    He sees her in the near future

## 2017-09-13 NOTE — TELEPHONE ENCOUNTER
Pt states that when she had the [address] yesterday, the technician told her she has shingles. Can you please call her with the US results as well as what to do about the shingles. Is she contagious?

## 2017-09-13 NOTE — TELEPHONE ENCOUNTER
Spoke with pt - states while getting her US today the tech noticed \"3 red spots in middle of her abdomen. \" Pt states there was only one there yesterday. She states it is painful. Wants to know if she has shingles.  Advised her MD can not diag her without seeing her first. She scheduled for tomorrow 9/14/17 at 9:35 am. Will forward to MD.

## 2017-09-14 ENCOUNTER — OFFICE VISIT (OUTPATIENT)
Dept: INTERNAL MEDICINE CLINIC | Age: 57
End: 2017-09-14

## 2017-09-14 ENCOUNTER — HOSPITAL ENCOUNTER (OUTPATIENT)
Dept: LAB | Age: 57
Discharge: HOME | End: 2017-09-14
Payer: MEDICARE

## 2017-09-14 VITALS
SYSTOLIC BLOOD PRESSURE: 131 MMHG | HEIGHT: 70 IN | WEIGHT: 159.8 LBS | OXYGEN SATURATION: 97 % | HEART RATE: 78 BPM | RESPIRATION RATE: 16 BRPM | BODY MASS INDEX: 22.88 KG/M2 | DIASTOLIC BLOOD PRESSURE: 81 MMHG

## 2017-09-14 DIAGNOSIS — G40.909 SEIZURE DISORDER (HCC): Primary | ICD-10-CM

## 2017-09-14 DIAGNOSIS — R79.89 ABNORMAL LFTS: ICD-10-CM

## 2017-09-14 DIAGNOSIS — L08.9 BACTERIAL SKIN INFECTION OF TRUNK: ICD-10-CM

## 2017-09-14 DIAGNOSIS — T50.905A MEDICATION SIDE EFFECT, INITIAL ENCOUNTER: ICD-10-CM

## 2017-09-14 DIAGNOSIS — B96.89 BACTERIAL SKIN INFECTION OF TRUNK: ICD-10-CM

## 2017-09-14 PROCEDURE — 36415 COLL VENOUS BLD VENIPUNCTURE: CPT

## 2017-09-14 PROCEDURE — 82977 ASSAY OF GGT: CPT

## 2017-09-14 PROCEDURE — 80164 ASSAY DIPROPYLACETIC ACD TOT: CPT

## 2017-09-14 PROCEDURE — 80076 HEPATIC FUNCTION PANEL: CPT

## 2017-09-14 RX ORDER — MUPIROCIN 20 MG/G
OINTMENT TOPICAL DAILY
Qty: 22 G | Refills: 0 | Status: SHIPPED | OUTPATIENT
Start: 2017-09-14 | End: 2018-01-17 | Stop reason: ALTCHOICE

## 2017-09-14 NOTE — PROGRESS NOTES
HISTORY OF PRESENT ILLNESS  Aaron Early is a 65 y.o. female. HPI Sb Herrera is seen today accompanied by her  for evaluation of a skin rash and follow up of abnormal LFTs and seizure disorder. Rash. She is concerned about shingles. A rash was noted when she had her abdominal ultrasound. She really was not aware of the rash though it is painful. On exam, she has two isolated erythematous regions that do not appear like shingles. Reassured her these appeared to be superficial skin infections. She believes they have been present for three days. Abnormal LFTs. She tells me she has a past medical history of fatty liver prior to gastric bypass surgery. She has a significant elevated GGT in the 600s. See previous telephone note with her neurologist.  We will repeat her LFTs today. Her abdominal ultrasound was unremarkable. If LFTs remain abnormal, we will proceed with a change in her seizure medication with a follow up with neurology. Seizure disorder. As above. We will cross over to [address] if LFTs remain high. I have spoke with her neurologist and tapering instructions for Depakote were reviewed as well. Lastly, we will check a Depakote level today. MedDATA/gwo         Review of Systems   Constitutional: Negative for chills and fever. Gastrointestinal: Negative for abdominal pain. Skin: Positive for rash. Neurological: Negative for seizures. Physical Exam   Abdominal: Soft. She exhibits no distension. There is no hepatosplenomegaly. There is no tenderness. Skin: Rash noted. Rash is pustular. Rash is not vesicular. ASSESSMENT and PLAN  Diagnoses and all orders for this visit:    1. Seizure disorder (HCC)  -     VALPROIC ACID, see hpi    2. Bacterial skin infection of trunk  -     mupirocin (BACTROBAN) 2 % ointment; Apply  to affected area daily.     3. Abnormal LFTs  -     HEPATIC FUNCTION PANEL    4. Medication side effect, initial encounter  -     GGT    Plan was reviewed with patient and family, understanding expressed

## 2017-09-14 NOTE — MR AVS SNAPSHOT
Visit Information Date & Time Provider Department Dept. Phone Encounter #  
 9/14/2017  9:35 AM France Laguna MD [facility] [phone] [id number] Your Appointments 9/5/2018 10:00 AM  
COMPLETE PHYSICAL with France Laguna MD  
[facility]) Appt Note: cpe  
 [address] [facility] [address]  
[facility] [address] [id number] [facility] Upcoming Health Maintenance Date Due DTaP/Tdap/Td series (1 - Tdap) 2/14/1982 BREAST CANCER SCRN MAMMOGRAM 10/11/2018 PAP AKA CERVICAL CYTOLOGY 3/7/2020 COLONOSCOPY 11/14/2021 Allergies as of 9/14/2017  Review Complete On: 9/14/2017 By: France Laguna MD  
  
 Severity Noted Reaction Type Reactions Aspirin High 07/22/2010    Other (comments) Has had gastric bypass, and three brain surgeries. Ibuprofen High 07/22/2010    Other (comments) Gastric bypass surgeon advised not to take . Hydrocodone  07/22/2010    Other (comments) Not allergic Percocet [Oxycodone-acetaminophen]  07/26/2010    Other (comments) Not allergic Prednisone  09/01/2017    Other (comments) Equilibrium off Current Immunizations  Reviewed on 8/28/2017 Name Date Influenza Nasal Vaccine 10/3/2015 Influenza Vaccine 8/25/2017, 9/9/2016, 9/30/2015, 10/31/2013 Pneumococcal Conjugate (PCV-13) 11/8/2016 Pneumococcal Polysaccharide (PPSV-23) 10/31/2015 Not reviewed this visit You Were Diagnosed With   
  
 Codes Comments Seiz[facility] disorder ([facility]    -  Primary ICD-10-CM: A61.711 ICD-9-CM: 345.90 Bacterial skin infection of trunk     ICD-10-CM: L08.9 ICD-9-CM: 059. 9 Abnormal LFTs     ICD-10-CM: R79.89 ICD-9-CM: 790.6 Medication side effect, initial encounter     ICD-10-CM: T88. 7XXA ICD-9-CM: E947.9 Vitals BP Pulse Resp Height(growth percentile) Weight(growth percentile) SpO2 131/81 (BP 1 Location: Right arm, BP Patient Position: Sitting) 78 16 5' 10\" (1.778 m) 159 lb 12.8 oz (72.5 kg) 97% BMI OB Status Smoking Status 22.93 kg/m2 Hysterectomy Current Every Day Smoker BMI and BSA Data Body Mass Index Body Surface Area  
 22.93 kg/m 2 1.89 m 2 Preferred Pharmacy Pharmacy Name Phone [address] [phone] Your Updated Medication List  
  
   
This list is accurate as of: 9/14/17 10:15 AM.  Always use your most recent med list.  
  
  
  
  
 buPROPion 75 mg tablet Commonly known as:  [facility] Take  by mouth two (2) times a day. butalbital-acetaminophen-caffeine -40 mg per tablet Commonly known as:  FIORICET, ESGIC  
TAKE 1 TABLET BY MOUTH EVERY 4 HOURS AS NEEDED FOR PAIN  
  
 clonazePAM 1 mg tablet Commonly known as:  Jeronimo Grave Take 1 mg by mouth two (2) times a day. divalproex  mg tablet Commonly known as:  DEPAKOTE Take 1 Tab by mouth four (4) times daily. FLUoxetine 40 mg capsule Commonly known as:  PROzac Take 80 mg by mouth daily. IRON PO Take 1,000 mg by mouth daily. meloxicam 15 mg tablet Commonly known as:  MOBIC  
TAKE 1 TABLET BY MOUTH  DAILY (REPLACES IBUPROFEN)  
  
 multivitamin tablet Commonly known as:  ONE A DAY Take 1 Tab by mouth daily. mupirocin 2 % ointment Commonly known as:  [facility] Apply  to affected area daily. PROBIOTIC BLEND PO Take 1 Cap by mouth daily. promethazine 25 mg tablet Commonly known as:  PHENERGAN Take 1 tablet by mouth  every 8 hours as needed for nausea SEROquel 300 mg tablet Generic drug:  QUEtiapine Take 400 mg by mouth nightly. Prescriptions Sent to Pharmacy Refills  
 mupirocin (BACTROBAN) 2 % ointment 0 Sig: Apply  to affected area daily.   
 Class: Normal  
 Pharmacy: [id number] [address]  #: [phone] Route: Topical  
  
We Performed the Following GGT [01869 CPT(R)] HEPATIC FUNCTION PANEL [17432 CPT(R)] Introducing [facility]! Ping Molina introduces UpTo patient portal. Now you can access parts of your medical record, email your doctor's office, and request medication refills online. 1. In your internet browser, go to [email or web address] 2. Click on the First Time User? Click Here link in the Sign In box. You will see the New Member Sign Up page. 3. Enter your UpTo Access Code exactly as it appears below. You will not need to use this code after youve completed the sign-up process. If you do not sign up before the expiration date, you must request a new code. · UpTo Access Code: [email or web address] Expires: 12/10/2017  1:22 PM 
 
4. Enter the last four digits of your Social Security Number (xxxx) and Date of Birth (mm/dd/yyyy) as indicated and click Submit. You will be taken to the next sign-up page. 5. Create a UpTo ID. This will be your UpTo login ID and cannot be changed, so think of one that is secure and easy to remember. 6. Create a UpTo password. You can change your password at any time. 7. Enter your Password Reset Question and Answer. This can be used at a later time if you forget your password. 8. Enter your e-mail address. You will receive e-mail notification when new information is available in [address] 9. Click Sign Up. You can now view and download portions of your medical record. 10. Click the Download Summary menu link to download a portable copy of your medical information. If you have questions, please visit the Frequently Asked Questions section of the UpTo website. Remember, UpTo is NOT to be used for urgent needs. For medical emergencies, dial 911. Now available from your iPhone and Android! Please provide this summary of care documentation to your next provider. Your primary care clinician is listed as STACEY LOUIS. If you have any questions after today's visit, please call [phone].

## 2017-09-15 DIAGNOSIS — R79.89 ABNORMAL LFTS: Primary | ICD-10-CM

## 2017-09-15 LAB
ALBUMIN SERPL-MCNC: 3.8 G/DL (ref 3.5–5.5)
ALP SERPL-CCNC: 230 IU/L (ref 39–117)
ALT SERPL-CCNC: 16 IU/L (ref 0–32)
AST SERPL-CCNC: 14 IU/L (ref 0–40)
BILIRUB DIRECT SERPL-MCNC: 0.09 MG/DL (ref 0–0.4)
BILIRUB SERPL-MCNC: 0.2 MG/DL (ref 0–1.2)
GGT SERPL-CCNC: 699 IU/L (ref 0–60)
PROT SERPL-MCNC: 7.1 G/DL (ref 6–8.5)
VALPROATE SERPL-MCNC: 72 UG/ML (ref 50–100)

## 2017-09-15 RX ORDER — LEVETIRACETAM 1000 MG/1
1000 TABLET ORAL 2 TIMES DAILY
Qty: 60 TAB | Refills: 3 | Status: SHIPPED | OUTPATIENT
Start: 2017-09-15 | End: 2017-09-20 | Stop reason: SDUPTHER

## 2017-09-15 NOTE — PROGRESS NOTES
Advised pt her liver tests are still abnormal.   - start Keppra 1000 mg bid - sent to pharmacy. - decrease depakote 3 x daily x 7 d, 2 x daily x 7 d, 1 daily x 7 days, then stop. Pt repeated directions. She thinks she has an upcoming appt with her neurologist - if not will schedule. - repeat ggt, hepatic panel one month.  Mailed her a copy of the lab, - copy on MD's desk

## 2017-09-15 NOTE — PROGRESS NOTES
Call- liver tests are still abnormal.  - start Keppra 1000 mg bid  - decrease depakote 3 x daily x 7 d, 2 x daily x 7 d, 1 daily x 7 days, then stop. Make sure she writes this down and has an upcoming appt with her neurologist  - repeat ggt, hepatic panel one month.  Mail her a copy of the lab, put a copy on my desk

## 2017-09-20 RX ORDER — LEVETIRACETAM 1000 MG/1
1000 TABLET ORAL 2 TIMES DAILY
Qty: 180 TAB | Refills: 1 | Status: SHIPPED | COMMUNITY
Start: 2017-09-20 | End: 2017-12-03 | Stop reason: SDUPTHER

## 2017-10-01 RX ORDER — MELOXICAM 15 MG/1
TABLET ORAL
Qty: 90 TAB | Refills: 3 | Status: SHIPPED | OUTPATIENT
Start: 2017-10-01 | End: 2018-01-01 | Stop reason: SDUPTHER

## 2017-10-05 ENCOUNTER — HOSPITAL ENCOUNTER (EMERGENCY)
Age: 57
Discharge: HOME | End: 2017-10-05
Attending: FAMILY MEDICINE

## 2017-10-05 ENCOUNTER — APPOINTMENT (OUTPATIENT)
Dept: GENERAL RADIOLOGY | Age: 57
End: 2017-10-05
Attending: FAMILY MEDICINE

## 2017-10-05 VITALS
OXYGEN SATURATION: 96 % | TEMPERATURE: 98.5 F | WEIGHT: 149 LBS | SYSTOLIC BLOOD PRESSURE: 129 MMHG | DIASTOLIC BLOOD PRESSURE: 77 MMHG | RESPIRATION RATE: 19 BRPM | BODY MASS INDEX: 21.33 KG/M2 | HEART RATE: 86 BPM | HEIGHT: 70 IN
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
## 2017-10-05 DIAGNOSIS — S40.011A CONTUSION OF RIGHT SHOULDER, INITIAL ENCOUNTER: Primary | ICD-10-CM

## 2017-10-05 RX ORDER — TRAMADOL HYDROCHLORIDE 50 MG/1
50 TABLET ORAL
Qty: 12 TAB | Refills: 0 | Status: SHIPPED | OUTPATIENT
Start: 2017-10-05 | End: 2018-01-17 | Stop reason: ALTCHOICE

## 2017-10-05 RX ORDER — METHOCARBAMOL 500 MG/1
500 TABLET, FILM COATED ORAL
Qty: 20 TAB | Refills: 0 | Status: SHIPPED | OUTPATIENT
Start: 2017-10-05 | End: 2018-01-17 | Stop reason: ALTCHOICE

## 2017-10-05 NOTE — DISCHARGE INSTRUCTIONS
Preventing Falls: Care Instructions  Your Care Instructions  Getting around your home safely can be a challenge if you have injuries or health problems that make it easy for you to fall. Loose rugs and furniture in walkways are among the dangers for many older people who have problems walking or who have poor eyesight. People who have conditions such as arthritis, osteoporosis, or dementia also have to be careful not to fall. You can make your home safer with a few simple measures. Follow-up care is a key part of your treatment and safety. Be sure to make and go to all appointments, and call your doctor if you are having problems. It's also a good idea to know your test results and keep a list of the medicines you take. How can you care for yourself at home? Taking care of yourself  · You may get dizzy if you do not drink enough water. To prevent dehydration, drink plenty of fluids, enough so that your urine is light yellow or clear like water. Choose water and other caffeine-free clear liquids. If you have kidney, heart, or liver disease and have to limit fluids, talk with your doctor before you increase the amount of fluids you drink. · Exercise regularly to improve your strength, muscle tone, and balance. Walk if you can. Swimming may be a good choice if you cannot walk easily. · Have your vision and hearing checked each year or any time you notice a change. If you have trouble seeing and hearing, you might not be able to avoid objects and could lose your balance. · Know the side effects of the medicines you take. Ask your doctor or pharmacist whether the medicines you take can affect your balance. Sleeping pills or sedatives can affect your balance. · Limit the amount of alcohol you drink. Alcohol can impair your balance and other senses. · Ask your doctor whether calluses or corns on your feet need to be removed.  If you wear loose-fitting shoes because of calluses or corns, you can lose your balance and fall. · Talk to your doctor if you have numbness in your feet. Preventing falls at home  · Remove raised doorway thresholds, throw rugs, and clutter. Repair loose carpet or raised areas in the floor. · Move furniture and electrical cords to keep them out of walking paths. · Use nonskid floor wax, and wipe up spills right away, especially on ceramic tile floors. · If you use a walker or cane, put rubber tips on it. If you use crutches, clean the bottoms of them regularly with an abrasive pad, such as steel wool. · Keep your house well lit, especially [facility], and outside walkways. Use night-lights in areas such as hallways and bathrooms. Add extra light switches or use remote switches (such as switches that go on or off when you clap your hands) to make it easier to turn lights on if you have to get up during the night. · Install sturdy handrails on stairways. · Move items in your cabinets so that the things you use a lot are on the lower shelves (about waist level). · Keep a cordless phone and a flashlight with new batteries by your bed. If possible, put a phone in each of the main rooms of your house, or carry a cell phone in case you fall and cannot reach a phone. Or, you can wear a device around your neck or wrist. You push a button that sends a signal for help. · Wear low-heeled shoes that fit well and give your feet good support. Use footwear with nonskid soles. Check the heels and soles of your shoes for wear. Repair or replace worn heels or soles. · Do not wear socks without shoes on wood floors. · Walk on the grass when the sidewalks are slippery. If you live in an area that gets snow and ice in the winter, sprinkle salt on slippery steps and sidewalks. Preventing falls in the bath  · Install grab bars and nonskid mats inside and outside your shower or tub and near the toilet and sinks. · Use shower chairs and bath benches.   · Use a hand-held shower head that will allow you to sit while showering. · Get into a tub or shower by putting the weaker leg in first. Get out of a tub or shower with your strong side first.  · Repair loose toilet seats and consider installing a raised toilet seat to make getting on and off the toilet easier. · Keep your bathroom door unlocked while you are in the shower. Where can you learn more? Go to http://jorje-mila.info/. Enter [phone] in the search box to learn more about \"Preventing Falls: Care Instructions. \"  Current as of: August 4, 2016  Content Version: 11.3  © 9337-5546 [employer]. Care instructions adapted under license by [employer] (which disclaims liability or warranty for this information). If you have questions about a medical condition or this instruction, always ask your healthcare professional. [place] [address] any warranty or liability for your use of this information. Shoulder Stretches: Exercises  Your Care Instructions  Here are some examples of exercises for your shoulder. Start each exercise slowly. Ease off the exercise if you start to have pain. Your doctor or physical therapist will tell you when you can start these exercises and which ones will work best for you. How to do the exercises  Note: These exercises should cause you to feel a gentle stretch, but no pain. Shoulder stretch    1.  a doorway and place one arm against the door frame. Your elbow should be a little higher than your shoulder. 2. Relax your shoulders as you lean forward, allowing your chest and shoulder muscles to stretch. You can also turn your body slightly away from your arm to stretch the muscles even more. 3. Hold for 15 to 30 seconds. 4. Repeat 2 to 4 times with each arm. Shoulder and chest stretch    Shoulder and chest stretch  1. While sitting, relax your upper body so you slump slightly in your chair.   2. As you breathe in, straighten your back and open your arms out to the sides. 3. Gently pull your shoulder blades back and downward. 4. Hold for 15 to 30 seconds as your breathe normally. 5. Repeat 2 to 4 times. Overhead stretch    1. Reach up over your head with both arms. 2. Hold for 15 to 30 seconds. 3. Repeat 2 to 4 times. Follow-up care is a key part of your treatment and safety. Be sure to make and go to all appointments, and call your doctor if you are having problems. It's also a good idea to know your test results and keep a list of the medicines you take. Where can you learn more? Go to http://jorje-mila.info/. Enter S254 in the search box to learn more about \"Shoulder Stretches: Exercises. \"  Current as of: March 21, 2017  Content Version: 11.3  © 4960-8904 [employer]. Care instructions adapted under license by [employer] (which disclaims liability or warranty for this information). If you have questions about a medical condition or this instruction, always ask your healthcare professional. [place] [address] any warranty or liability for your use of this information.

## 2017-10-29 NOTE — UC PROVIDER NOTE
Patient is a 65 y.o. female presenting with fall. The history is provided by the patient. Fall   The accident occurred yesterday. The fall occurred while walking (see nurse note). She landed on hard floor. The point of impact was the right shoulder. The pain is present in the right shoulder. The pain is moderate. She was not ambulatory at the scene. There was no entrapment after the fall. There was no drug use involved in the accident. There was no alcohol use involved in the accident. Pertinent negatives include no extremity weakness. Associated symptoms comments: Decreased movement and pain with movement. The symptoms are aggravated by activity and use of injured limb. Past Medical History:   Diagnosis Date    Anxiety disorder     Brain aneurysm 2/12/2010    Headache(784.0) 2/12/2010    Hyperlipidemia 2/12/2010    Insomnia 2/12/2010    LFT'S ABNORMAL 2/12/2010    Neurological disorder     Brain anuruysm    Screen for colon cancer 11/13/2014    Se[facility] ([facility]         Past Surgical History:   Procedure Laterality Date    HX CHOLECYSTECTOMY      HX COLONOSCOPY  2015    due 26    HX CRANIOTOMY      And cerebral shunt, by Dr. Melyssa Mckeon. Followed by Dr. Marsh Mohs.  HX HERNIA REPAIR      HX PARTIAL HYSTERECTOMY           Family History   Problem Relation Age of Onset    Diabetes Mother     Cancer Father     Cancer Maternal Grandmother     Heart Disease Maternal Grandfather         Social History     Social History    Marital status:      Spouse name: N/A    Number of children: N/A    Years of education: N/A     Occupational History    Not on file.      Social History Main Topics    Smoking status: Current Every Day Smoker     Years: 30.00     Types: Cigarettes    Smokeless tobacco: Never Used      Comment: a few cigarettes daily    Alcohol use No    Drug use: No    Sexual activity: Not on file     Other Topics Concern    Not on file     Social History Narrative ALLERGIES: Aspirin; Ibuprofen; Hydrocodone; Percocet [oxycodone-acetaminophen]; and Prednisone    Review of Systems   Musculoskeletal: Negative for extremity weakness. All other systems reviewed and are negative. Vitals:    10/05/17 1145 10/05/17 1147   BP:  129/77   Pulse: 86    Resp: 19    Temp: 98.5 °F (36.9 °C)    SpO2: 96%    Weight: 67.6 kg (149 lb)    Height: 5' 10\" (1.778 m)        Physical Exam   Constitutional: No distress. Musculoskeletal:        Right shoulder: She exhibits decreased range of motion, tenderness and pain. She exhibits no swelling and no effusion. Right elbow: Normal.       Cervical back: Normal.   Nursing note and vitals reviewed. MDM     Differential Diagnosis; Clinical Impression; Plan:     CLINICAL IMPRESSION:  Contusion of right shoulder, initial encounter  (primary encounter diagnosis)  Fall, initial encounter      DDX    Plan:    Xray- unremarkable  Tramadol and robaxin   Follow with Ortho if sxs persist    Amount and/or Complexity of Data Reviewed:   Tests in the radiology section of CPT®:  Ordered and reviewed   Review and summarize past medical records:  Yes  Risk of Significant Complications, Morbidity, and/or Mortality:   Presenting problems: Moderate  Diagnostic procedures: Moderate  Management options:   Moderate  Progress:   Patient progress:  Stable      Procedures

## 2017-11-28 ENCOUNTER — TELEPHONE (OUTPATIENT)
Dept: INTERNAL MEDICINE CLINIC | Age: 57
End: 2017-11-28

## 2017-11-30 NOTE — TELEPHONE ENCOUNTER
Spoke with pt - advised can print her an order for mammogram but what US is she requesting? She states MD wanted her to repeat US due to her liver. Advised her at her last appt 9/14/17 MD printed lab slip for her to repeat in one month but do not see anything about US.  Will forward to MD.

## 2017-12-01 NOTE — TELEPHONE ENCOUNTER
Advised pt MD wanted her to repeat her labs for liver - not US. She's overdue and MD wants her come in asap. She will come in Monday 12/4/17. Reprinted lab slip - at  ready to .

## 2017-12-04 ENCOUNTER — HOSPITAL ENCOUNTER (OUTPATIENT)
Dept: LAB | Age: 57
Discharge: HOME | End: 2017-12-04
Payer: MEDICARE

## 2017-12-04 PROCEDURE — 36415 COLL VENOUS BLD VENIPUNCTURE: CPT

## 2017-12-04 PROCEDURE — 80076 HEPATIC FUNCTION PANEL: CPT

## 2017-12-04 PROCEDURE — 82977 ASSAY OF GGT: CPT

## 2017-12-04 RX ORDER — LEVETIRACETAM 1000 MG/1
TABLET ORAL
Qty: 180 TAB | Refills: 1 | Status: ON HOLD | COMMUNITY
Start: 2017-12-04 | End: 2018-01-01

## 2017-12-05 LAB
ALBUMIN SERPL-MCNC: 3.8 G/DL (ref 3.5–5.5)
ALP SERPL-CCNC: 292 IU/L (ref 39–117)
ALT SERPL-CCNC: 106 IU/L (ref 0–32)
AST SERPL-CCNC: 95 IU/L (ref 0–40)
BILIRUB DIRECT SERPL-MCNC: 0.09 MG/DL (ref 0–0.4)
BILIRUB SERPL-MCNC: 0.2 MG/DL (ref 0–1.2)
GGT SERPL-CCNC: 464 IU/L (ref 0–60)
PROT SERPL-MCNC: 6.4 G/DL (ref 6–8.5)

## 2017-12-05 NOTE — PROGRESS NOTES
Call- LFTs are still abnormal, schedule consult with Dr Milagro Fan- liver specialist. She should avoid all alcohol while this is being checked out

## 2017-12-07 DIAGNOSIS — R79.89 ABNORMAL LFTS: Primary | ICD-10-CM

## 2017-12-07 NOTE — PROGRESS NOTES
Advised pt MD said she needs no extra vitamins. She should confirm with her neurologist as to which seizure med she should be on.

## 2017-12-07 NOTE — PROGRESS NOTES
Advised pt her LFTs are still abnormal. MD recommends consult with Dr Brennon Bentley - liver specialist. She should avoid all alcohol while this is being checked out. Called Dr Kimi Willis office - spoke with Jeri Rose - requesting appt - she advised me he is booked out to March. Asked if pt could possibly be seen by PA or NP - she states no he prefers to see new pt's. He will review chart - their office will call pt to schedule. Pt also wanted to ask MD if she should be taking any vitamins? She takes multivitamin only. She is off her Depakote and wants to know if MD wants her to continue Keppra 1000 mg bid?  Will forward to MD.

## 2018-01-01 ENCOUNTER — HOSPITAL ENCOUNTER (OUTPATIENT)
Dept: GENERAL RADIOLOGY | Age: 58
Discharge: HOME | End: 2018-07-16
Attending: ORTHOPAEDIC SURGERY
Payer: MEDICARE

## 2018-01-01 ENCOUNTER — TELEPHONE (OUTPATIENT)
Dept: INTERNAL MEDICINE CLINIC | Age: 58
End: 2018-01-01

## 2018-01-01 ENCOUNTER — HOSPITAL ENCOUNTER (OUTPATIENT)
Age: 58
Setting detail: OBSERVATION
Discharge: HOME | End: 2018-09-20
Attending: EMERGENCY MEDICINE | Admitting: FAMILY MEDICINE
Payer: MEDICARE

## 2018-01-01 ENCOUNTER — TELEPHONE (OUTPATIENT)
Dept: HEMATOLOGY | Age: 58
End: 2018-01-01

## 2018-01-01 ENCOUNTER — OFFICE VISIT (OUTPATIENT)
Dept: INTERNAL MEDICINE CLINIC | Age: 58
End: 2018-01-01

## 2018-01-01 ENCOUNTER — APPOINTMENT (OUTPATIENT)
Dept: GENERAL RADIOLOGY | Age: 58
End: 2018-01-01
Attending: EMERGENCY MEDICINE
Payer: MEDICARE

## 2018-01-01 ENCOUNTER — APPOINTMENT (OUTPATIENT)
Dept: CT IMAGING | Age: 58
End: 2018-01-01
Attending: EMERGENCY MEDICINE
Payer: MEDICARE

## 2018-01-01 ENCOUNTER — HOSPITAL ENCOUNTER (OUTPATIENT)
Dept: LAB | Age: 58
Discharge: HOME | End: 2018-12-18
Payer: MEDICARE

## 2018-01-01 ENCOUNTER — HOSPITAL ENCOUNTER (OUTPATIENT)
Dept: LAB | Age: 58
Discharge: HOME | End: 2018-10-30
Payer: MEDICARE

## 2018-01-01 ENCOUNTER — HOSPITAL ENCOUNTER (OUTPATIENT)
Dept: CT IMAGING | Age: 58
Discharge: HOME | End: 2018-07-16
Attending: ORTHOPAEDIC SURGERY
Payer: MEDICARE

## 2018-01-01 ENCOUNTER — APPOINTMENT (OUTPATIENT)
Dept: CT IMAGING | Age: 58
End: 2018-01-01
Attending: FAMILY MEDICINE
Payer: MEDICARE

## 2018-01-01 ENCOUNTER — HOSPITAL ENCOUNTER (OUTPATIENT)
Dept: LAB | Age: 58
Discharge: HOME | End: 2018-09-05
Payer: MEDICARE

## 2018-01-01 ENCOUNTER — DOCUMENTATION ONLY (OUTPATIENT)
Dept: INTERNAL MEDICINE CLINIC | Age: 58
End: 2018-01-01

## 2018-01-01 VITALS
BODY MASS INDEX: 23.13 KG/M2 | RESPIRATION RATE: 18 BRPM | HEIGHT: 70 IN | DIASTOLIC BLOOD PRESSURE: 72 MMHG | TEMPERATURE: 98.2 F | SYSTOLIC BLOOD PRESSURE: 100 MMHG | HEART RATE: 89 BPM | OXYGEN SATURATION: 99 % | WEIGHT: 161.6 LBS

## 2018-01-01 VITALS
HEART RATE: 88 BPM | DIASTOLIC BLOOD PRESSURE: 74 MMHG | TEMPERATURE: 97.7 F | OXYGEN SATURATION: 98 % | BODY MASS INDEX: 23.48 KG/M2 | SYSTOLIC BLOOD PRESSURE: 128 MMHG | HEIGHT: 70 IN | WEIGHT: 164 LBS | RESPIRATION RATE: 16 BRPM

## 2018-01-01 VITALS
OXYGEN SATURATION: 96 % | RESPIRATION RATE: 20 BRPM | HEART RATE: 99 BPM | WEIGHT: 163.58 LBS | BODY MASS INDEX: 23.42 KG/M2 | TEMPERATURE: 97.7 F | DIASTOLIC BLOOD PRESSURE: 69 MMHG | SYSTOLIC BLOOD PRESSURE: 115 MMHG | HEIGHT: 70 IN

## 2018-01-01 VITALS
HEART RATE: 79 BPM | HEIGHT: 70 IN | WEIGHT: 159 LBS | OXYGEN SATURATION: 97 % | BODY MASS INDEX: 22.76 KG/M2 | DIASTOLIC BLOOD PRESSURE: 76 MMHG | TEMPERATURE: 98.3 F | RESPIRATION RATE: 16 BRPM | SYSTOLIC BLOOD PRESSURE: 120 MMHG

## 2018-01-01 VITALS
RESPIRATION RATE: 16 BRPM | WEIGHT: 161 LBS | HEIGHT: 70 IN | SYSTOLIC BLOOD PRESSURE: 130 MMHG | BODY MASS INDEX: 23.05 KG/M2 | OXYGEN SATURATION: 97 % | HEART RATE: 94 BPM | TEMPERATURE: 97.7 F | DIASTOLIC BLOOD PRESSURE: 82 MMHG

## 2018-01-01 DIAGNOSIS — G40.909 SEIZURE DISORDER (HCC): Primary | ICD-10-CM

## 2018-01-01 DIAGNOSIS — M25.512 LEFT SHOULDER PAIN: ICD-10-CM

## 2018-01-01 DIAGNOSIS — Z13.31 SCREENING FOR DEPRESSION: ICD-10-CM

## 2018-01-01 DIAGNOSIS — Z00.00 MEDICARE ANNUAL WELLNESS VISIT, SUBSEQUENT: Primary | ICD-10-CM

## 2018-01-01 DIAGNOSIS — F33.9 RECURRENT DEPRESSION (HCC): ICD-10-CM

## 2018-01-01 DIAGNOSIS — E78.2 MIXED HYPERLIPIDEMIA: ICD-10-CM

## 2018-01-01 DIAGNOSIS — I67.1 BRAIN ANEURYSM: ICD-10-CM

## 2018-01-01 DIAGNOSIS — R41.82 ALTERED MENTAL STATUS, UNSPECIFIED ALTERED MENTAL STATUS TYPE: Primary | ICD-10-CM

## 2018-01-01 DIAGNOSIS — N95.9 POST MENOPAUSAL PROBLEMS: ICD-10-CM

## 2018-01-01 DIAGNOSIS — R74.8 ELEVATED LIVER ENZYMES: ICD-10-CM

## 2018-01-01 DIAGNOSIS — M75.102 ROTATOR CUFF SYNDROME OF LEFT SHOULDER: ICD-10-CM

## 2018-01-01 DIAGNOSIS — Z23 ENCOUNTER FOR IMMUNIZATION: ICD-10-CM

## 2018-01-01 DIAGNOSIS — R11.0 CHRONIC NAUSEA: ICD-10-CM

## 2018-01-01 DIAGNOSIS — G40.909 SEIZURE DISORDER (HCC): ICD-10-CM

## 2018-01-01 LAB
ALBUMIN SERPL-MCNC: 3.7 G/DL (ref 3.5–5)
ALBUMIN SERPL-MCNC: 4 G/DL (ref 3.5–5)
ALBUMIN/GLOB SERPL: 0.9 {RATIO} (ref 1.1–2.2)
ALBUMIN/GLOB SERPL: 0.9 {RATIO} (ref 1.1–2.2)
ALP SERPL-CCNC: 261 U/L (ref 45–117)
ALP SERPL-CCNC: 276 U/L (ref 45–117)
ALT SERPL-CCNC: 25 U/L (ref 12–78)
ALT SERPL-CCNC: 28 U/L (ref 12–78)
AMMONIA PLAS-SCNC: 15 UMOL/L
AMPHET UR QL SCN: POSITIVE
ANION GAP SERPL CALC-SCNC: 11 MMOL/L (ref 5–15)
ANION GAP SERPL CALC-SCNC: 11 MMOL/L (ref 5–15)
APAP SERPL-MCNC: <2 UG/ML (ref 10–30)
APPEARANCE UR: ABNORMAL
APPEARANCE UR: CLEAR
APTT PPP: 27.6 SEC (ref 22.1–32)
AST SERPL-CCNC: 32 U/L (ref 15–37)
AST SERPL-CCNC: 32 U/L (ref 15–37)
ATRIAL RATE: 82 BPM
BACTERIA URNS QL MICRO: NEGATIVE /HPF
BARBITURATES UR QL SCN: POSITIVE
BASOPHILS # BLD AUTO: 0.1 X10E3/UL (ref 0–0.2)
BASOPHILS # BLD: 0 K/UL (ref 0–0.1)
BASOPHILS # BLD: 0 K/UL (ref 0–0.1)
BASOPHILS NFR BLD AUTO: 1 %
BASOPHILS NFR BLD: 0 % (ref 0–1)
BASOPHILS NFR BLD: 0 % (ref 0–1)
BENZODIAZ UR QL: POSITIVE
BILIRUB SERPL-MCNC: 0.4 MG/DL (ref 0.2–1)
BILIRUB SERPL-MCNC: 0.5 MG/DL (ref 0.2–1)
BILIRUB UR QL CFM: NEGATIVE
BILIRUB UR QL STRIP: NEGATIVE
BUN SERPL-MCNC: 10 MG/DL (ref 6–20)
BUN SERPL-MCNC: 13 MG/DL (ref 6–20)
BUN SERPL-MCNC: 14 MG/DL (ref 6–24)
BUN/CREAT SERPL: 16 (ref 9–23)
BUN/CREAT SERPL: 17 (ref 12–20)
BUN/CREAT SERPL: 21 (ref 12–20)
C DIFF GDH STL QL: NEGATIVE
C DIFF TOX A+B STL QL IA: NEGATIVE
CALCIUM SERPL-MCNC: 8.5 MG/DL (ref 8.5–10.1)
CALCIUM SERPL-MCNC: 8.8 MG/DL (ref 8.5–10.1)
CALCIUM SERPL-MCNC: 9.7 MG/DL (ref 8.7–10.2)
CALCULATED P AXIS, ECG09: 67 DEGREES
CALCULATED R AXIS, ECG10: 51 DEGREES
CALCULATED T AXIS, ECG11: 51 DEGREES
CANNABINOIDS UR QL SCN: NEGATIVE
CHLORIDE SERPL-SCNC: 103 MMOL/L (ref 97–108)
CHLORIDE SERPL-SCNC: 104 MMOL/L (ref 97–108)
CHLORIDE SERPL-SCNC: 98 MMOL/L (ref 96–106)
CHOLEST SERPL-MCNC: 240 MG/DL
CHOLEST SERPL-MCNC: 253 MG/DL (ref 100–199)
CK MB CFR SERPL CALC: 2.3 % (ref 0–2.5)
CK MB SERPL-MCNC: 7.5 NG/ML (ref 5–25)
CK SERPL-CCNC: 330 U/L (ref 26–192)
CK SERPL-CCNC: 332 U/L (ref 26–192)
CK SERPL-CCNC: 356 U/L (ref 26–192)
CO2 SERPL-SCNC: 23 MMOL/L (ref 20–29)
CO2 SERPL-SCNC: 23 MMOL/L (ref 21–32)
CO2 SERPL-SCNC: 24 MMOL/L (ref 21–32)
COCAINE UR QL SCN: NEGATIVE
COLOR UR: ABNORMAL
COLOR UR: YELLOW
CREAT SERPL-MCNC: 0.58 MG/DL (ref 0.55–1.02)
CREAT SERPL-MCNC: 0.63 MG/DL (ref 0.55–1.02)
CREAT SERPL-MCNC: 0.86 MG/DL (ref 0.57–1)
DIAGNOSIS, 93000: NORMAL
DIFFERENTIAL METHOD BLD: ABNORMAL
DIFFERENTIAL METHOD BLD: NORMAL
DRUG SCRN COMMENT,DRGCM: ABNORMAL
DRUGS UR: NORMAL
EOSINOPHIL # BLD AUTO: 0.3 X10E3/UL (ref 0–0.4)
EOSINOPHIL # BLD: 0 K/UL (ref 0–0.4)
EOSINOPHIL # BLD: 0.1 K/UL (ref 0–0.4)
EOSINOPHIL NFR BLD AUTO: 2 %
EOSINOPHIL NFR BLD: 0 % (ref 0–7)
EOSINOPHIL NFR BLD: 1 % (ref 0–7)
EPITH CASTS URNS QL MICRO: ABNORMAL /LPF
ERYTHROCYTE [DISTWIDTH] IN BLOOD BY AUTOMATED COUNT: 14 % (ref 11.5–14.5)
ERYTHROCYTE [DISTWIDTH] IN BLOOD BY AUTOMATED COUNT: 14 % (ref 11.5–14.5)
ERYTHROCYTE [DISTWIDTH] IN BLOOD BY AUTOMATED COUNT: 14 % (ref 12.3–15.4)
ETHANOL SERPL-MCNC: <10 MG/DL
FOLATE SERPL-MCNC: 15.8 NG/ML (ref 5–21)
GLOBULIN SER CALC-MCNC: 4.3 G/DL (ref 2–4)
GLOBULIN SER CALC-MCNC: 4.4 G/DL (ref 2–4)
GLUCOSE SERPL-MCNC: 102 MG/DL (ref 65–100)
GLUCOSE SERPL-MCNC: 125 MG/DL (ref 65–100)
GLUCOSE SERPL-MCNC: 96 MG/DL (ref 65–99)
GLUCOSE UR QL: NEGATIVE
GLUCOSE UR STRIP.AUTO-MCNC: NEGATIVE MG/DL
HCT VFR BLD AUTO: 41.6 % (ref 34–46.6)
HCT VFR BLD AUTO: 42.7 % (ref 35–47)
HCT VFR BLD AUTO: 43.9 % (ref 35–47)
HDLC SERPL-MCNC: 86 MG/DL
HDLC SERPL-MCNC: 92 MG/DL
HDLC SERPL: 2.6 {RATIO} (ref 0–5)
HGB BLD-MCNC: 12.8 G/DL (ref 11.1–15.9)
HGB BLD-MCNC: 13.7 G/DL (ref 11.5–16)
HGB BLD-MCNC: 14 G/DL (ref 11.5–16)
HGB UR QL STRIP: NEGATIVE
HGB UR QL STRIP: NEGATIVE
IMM GRANULOCYTES # BLD: 0 K/UL (ref 0–0.04)
IMM GRANULOCYTES # BLD: 0.1 K/UL (ref 0–0.04)
IMM GRANULOCYTES # BLD: 0.1 X10E3/UL (ref 0–0.1)
IMM GRANULOCYTES NFR BLD AUTO: 0 % (ref 0–0.5)
IMM GRANULOCYTES NFR BLD AUTO: 1 % (ref 0–0.5)
IMM GRANULOCYTES NFR BLD: 1 %
INR PPP: 1 (ref 0.9–1.1)
INTERPRETATION: NORMAL
KETONES UR QL STRIP.AUTO: ABNORMAL MG/DL
KETONES UR QL STRIP: NEGATIVE
LDLC SERPL CALC-MCNC: 119.2 MG/DL (ref 0–100)
LDLC SERPL CALC-MCNC: 135 MG/DL (ref 0–99)
LEUKOCYTE ESTERASE UR QL STRIP.AUTO: NEGATIVE
LEUKOCYTE ESTERASE UR QL STRIP: NEGATIVE
LEVETIRACETAM SERPL-MCNC: 32 UG/ML (ref 10–40)
LEVETIRACETAM SERPL-MCNC: 40 UG/ML (ref 10–40)
LEVETIRACETAM SERPL-MCNC: NORMAL UG/ML (ref 10–40)
LIPID PROFILE,FLP: ABNORMAL
LYMPHOCYTES # BLD AUTO: 2.8 X10E3/UL (ref 0.7–3.1)
LYMPHOCYTES # BLD: 1.4 K/UL (ref 0.8–3.5)
LYMPHOCYTES # BLD: 1.7 K/UL (ref 0.8–3.5)
LYMPHOCYTES NFR BLD AUTO: 22 %
LYMPHOCYTES NFR BLD: 13 % (ref 12–49)
LYMPHOCYTES NFR BLD: 17 % (ref 12–49)
MAGNESIUM SERPL-MCNC: 1.8 MG/DL (ref 1.6–2.4)
MAGNESIUM SERPL-MCNC: 1.9 MG/DL (ref 1.6–2.4)
MCH RBC QN AUTO: 29.9 PG (ref 26.6–33)
MCH RBC QN AUTO: 30.5 PG (ref 26–34)
MCH RBC QN AUTO: 30.8 PG (ref 26–34)
MCHC RBC AUTO-ENTMCNC: 30.8 G/DL (ref 31.5–35.7)
MCHC RBC AUTO-ENTMCNC: 31.9 G/DL (ref 30–36.5)
MCHC RBC AUTO-ENTMCNC: 32.1 G/DL (ref 30–36.5)
MCV RBC AUTO: 95.6 FL (ref 80–99)
MCV RBC AUTO: 96 FL (ref 80–99)
MCV RBC AUTO: 97 FL (ref 79–97)
METHADONE UR QL: NEGATIVE
MICRO URNS: ABNORMAL
MONOCYTES # BLD AUTO: 1 X10E3/UL (ref 0.1–0.9)
MONOCYTES # BLD: 0.8 K/UL (ref 0–1)
MONOCYTES # BLD: 0.9 K/UL (ref 0–1)
MONOCYTES NFR BLD AUTO: 8 %
MONOCYTES NFR BLD: 8 % (ref 5–13)
MONOCYTES NFR BLD: 9 % (ref 5–13)
MUCOUS THREADS URNS QL MICRO: ABNORMAL /LPF
NEUTROPHILS # BLD AUTO: 8.4 X10E3/UL (ref 1.4–7)
NEUTROPHILS NFR BLD AUTO: 66 %
NEUTS SEG # BLD: 6.8 K/UL (ref 1.8–8)
NEUTS SEG # BLD: 8 K/UL (ref 1.8–8)
NEUTS SEG NFR BLD: 72 % (ref 32–75)
NEUTS SEG NFR BLD: 78 % (ref 32–75)
NITRITE UR QL STRIP.AUTO: NEGATIVE
NITRITE UR QL STRIP: NEGATIVE
NRBC # BLD: 0 K/UL (ref 0–0.01)
NRBC # BLD: 0 K/UL (ref 0–0.01)
NRBC BLD-RTO: 0 PER 100 WBC
NRBC BLD-RTO: 0 PER 100 WBC
OPIATES UR QL: NEGATIVE
P-R INTERVAL, ECG05: 148 MS
PCP UR QL: NEGATIVE
PH UR STRIP: 5 [PH] (ref 5–7.5)
PH UR STRIP: 5 [PH] (ref 5–8)
PHOSPHATE SERPL-MCNC: 2.5 MG/DL (ref 2.6–4.7)
PLATELET # BLD AUTO: 319 K/UL (ref 150–400)
PLATELET # BLD AUTO: 335 K/UL (ref 150–400)
PLATELET # BLD AUTO: 574 X10E3/UL (ref 150–379)
PMV BLD AUTO: 11.2 FL (ref 8.9–12.9)
PMV BLD AUTO: 11.6 FL (ref 8.9–12.9)
POTASSIUM SERPL-SCNC: 3.4 MMOL/L (ref 3.5–5.1)
POTASSIUM SERPL-SCNC: 3.8 MMOL/L (ref 3.5–5.1)
POTASSIUM SERPL-SCNC: 5.4 MMOL/L (ref 3.5–5.2)
PROT SERPL-MCNC: 8 G/DL (ref 6.4–8.2)
PROT SERPL-MCNC: 8.4 G/DL (ref 6.4–8.2)
PROT UR QL STRIP: NEGATIVE
PROT UR STRIP-MCNC: NEGATIVE MG/DL
PROTHROMBIN TIME: 9.9 SEC (ref 9–11.1)
Q-T INTERVAL, ECG07: 386 MS
QRS DURATION, ECG06: 78 MS
QTC CALCULATION (BEZET), ECG08: 450 MS
RBC # BLD AUTO: 4.28 X10E6/UL (ref 3.77–5.28)
RBC # BLD AUTO: 4.45 M/UL (ref 3.8–5.2)
RBC # BLD AUTO: 4.59 M/UL (ref 3.8–5.2)
RBC #/AREA URNS HPF: ABNORMAL /HPF (ref 0–5)
SALICYLATES SERPL-MCNC: 3.9 MG/DL (ref 2.8–20)
SODIUM SERPL-SCNC: 137 MMOL/L (ref 134–144)
SODIUM SERPL-SCNC: 138 MMOL/L (ref 136–145)
SODIUM SERPL-SCNC: 138 MMOL/L (ref 136–145)
SP GR UR REFRACTOMETRY: 1.03 (ref 1–1.03)
SP GR UR: 1.02 (ref 1–1.03)
THERAPEUTIC RANGE,PTTT: NORMAL SECS (ref 58–77)
TRIGL SERPL-MCNC: 144 MG/DL (ref ?–150)
TRIGL SERPL-MCNC: 162 MG/DL (ref 0–149)
TROPONIN I SERPL-MCNC: <0.05 NG/ML
TSH SERPL DL<=0.05 MIU/L-ACNC: 0.81 UIU/ML (ref 0.36–3.74)
UA: UC IF INDICATED,UAUC: ABNORMAL
UROBILINOGEN UR QL STRIP.AUTO: 0.2 EU/DL (ref 0.2–1)
UROBILINOGEN UR STRIP-MCNC: 0.2 MG/DL (ref 0.2–1)
VENTRICULAR RATE, ECG03: 82 BPM
VIT B12 SERPL-MCNC: 699 PG/ML (ref 193–986)
VLDLC SERPL CALC-MCNC: 28.8 MG/DL
VLDLC SERPL CALC-MCNC: 32 MG/DL (ref 5–40)
WBC # BLD AUTO: 10.2 K/UL (ref 3.6–11)
WBC # BLD AUTO: 12.7 X10E3/UL (ref 3.4–10.8)
WBC # BLD AUTO: 9.5 K/UL (ref 3.6–11)
WBC URNS QL MICRO: ABNORMAL /HPF (ref 0–4)

## 2018-01-01 PROCEDURE — 96372 THER/PROPH/DIAG INJ SC/IM: CPT

## 2018-01-01 PROCEDURE — 71045 X-RAY EXAM CHEST 1 VIEW: CPT

## 2018-01-01 PROCEDURE — 80053 COMPREHEN METABOLIC PANEL: CPT | Performed by: FAMILY MEDICINE

## 2018-01-01 PROCEDURE — 80177 DRUG SCRN QUAN LEVETIRACETAM: CPT | Performed by: FAMILY MEDICINE

## 2018-01-01 PROCEDURE — 36415 COLL VENOUS BLD VENIPUNCTURE: CPT | Performed by: FAMILY MEDICINE

## 2018-01-01 PROCEDURE — 80177 DRUG SCRN QUAN LEVETIRACETAM: CPT

## 2018-01-01 PROCEDURE — 96360 HYDRATION IV INFUSION INIT: CPT

## 2018-01-01 PROCEDURE — 70486 CT MAXILLOFACIAL W/O DYE: CPT

## 2018-01-01 PROCEDURE — 94760 N-INVAS EAR/PLS OXIMETRY 1: CPT

## 2018-01-01 PROCEDURE — 74011000250 HC RX REV CODE- 250: Performed by: RADIOLOGY

## 2018-01-01 PROCEDURE — 80307 DRUG TEST PRSMV CHEM ANLYZR: CPT | Performed by: FAMILY MEDICINE

## 2018-01-01 PROCEDURE — 72125 CT NECK SPINE W/O DYE: CPT

## 2018-01-01 PROCEDURE — 85025 COMPLETE CBC W/AUTO DIFF WBC: CPT

## 2018-01-01 PROCEDURE — 87449 NOS EACH ORGANISM AG IA: CPT | Performed by: INTERNAL MEDICINE

## 2018-01-01 PROCEDURE — 82746 ASSAY OF FOLIC ACID SERUM: CPT | Performed by: FAMILY MEDICINE

## 2018-01-01 PROCEDURE — 84484 ASSAY OF TROPONIN QUANT: CPT | Performed by: FAMILY MEDICINE

## 2018-01-01 PROCEDURE — 74011250637 HC RX REV CODE- 250/637: Performed by: HOSPITALIST

## 2018-01-01 PROCEDURE — 84484 ASSAY OF TROPONIN QUANT: CPT | Performed by: EMERGENCY MEDICINE

## 2018-01-01 PROCEDURE — 74011250637 HC RX REV CODE- 250/637: Performed by: INTERNAL MEDICINE

## 2018-01-01 PROCEDURE — 82140 ASSAY OF AMMONIA: CPT | Performed by: EMERGENCY MEDICINE

## 2018-01-01 PROCEDURE — 74011636320 HC RX REV CODE- 636/320: Performed by: EMERGENCY MEDICINE

## 2018-01-01 PROCEDURE — 82570 ASSAY OF URINE CREATININE: CPT

## 2018-01-01 PROCEDURE — 85025 COMPLETE CBC W/AUTO DIFF WBC: CPT | Performed by: FAMILY MEDICINE

## 2018-01-01 PROCEDURE — 36415 COLL VENOUS BLD VENIPUNCTURE: CPT

## 2018-01-01 PROCEDURE — 77010033678 HC OXYGEN DAILY

## 2018-01-01 PROCEDURE — 97530 THERAPEUTIC ACTIVITIES: CPT

## 2018-01-01 PROCEDURE — 80061 LIPID PANEL: CPT

## 2018-01-01 PROCEDURE — 99218 HC RM OBSERVATION: CPT

## 2018-01-01 PROCEDURE — 96361 HYDRATE IV INFUSION ADD-ON: CPT

## 2018-01-01 PROCEDURE — 74011250636 HC RX REV CODE- 250/636: Performed by: EMERGENCY MEDICINE

## 2018-01-01 PROCEDURE — 93005 ELECTROCARDIOGRAM TRACING: CPT

## 2018-01-01 PROCEDURE — 80307 DRUG TEST PRSMV CHEM ANLYZR: CPT | Performed by: EMERGENCY MEDICINE

## 2018-01-01 PROCEDURE — G8978 MOBILITY CURRENT STATUS: HCPCS

## 2018-01-01 PROCEDURE — 74011000258 HC RX REV CODE- 258: Performed by: EMERGENCY MEDICINE

## 2018-01-01 PROCEDURE — 74011250636 HC RX REV CODE- 250/636: Performed by: FAMILY MEDICINE

## 2018-01-01 PROCEDURE — 73201 CT UPPER EXTREMITY W/DYE: CPT

## 2018-01-01 PROCEDURE — 99285 EMERGENCY DEPT VISIT HI MDM: CPT

## 2018-01-01 PROCEDURE — 80053 COMPREHEN METABOLIC PANEL: CPT | Performed by: EMERGENCY MEDICINE

## 2018-01-01 PROCEDURE — 81001 URINALYSIS AUTO W/SCOPE: CPT | Performed by: EMERGENCY MEDICINE

## 2018-01-01 PROCEDURE — 80061 LIPID PANEL: CPT | Performed by: EMERGENCY MEDICINE

## 2018-01-01 PROCEDURE — G8989 SELF CARE D/C STATUS: HCPCS

## 2018-01-01 PROCEDURE — 85025 COMPLETE CBC W/AUTO DIFF WBC: CPT | Performed by: EMERGENCY MEDICINE

## 2018-01-01 PROCEDURE — G8988 SELF CARE GOAL STATUS: HCPCS

## 2018-01-01 PROCEDURE — 81003 URINALYSIS AUTO W/O SCOPE: CPT

## 2018-01-01 PROCEDURE — 82607 VITAMIN B-12: CPT | Performed by: EMERGENCY MEDICINE

## 2018-01-01 PROCEDURE — 77030014113 XR ARTHRO SHOULDER LT

## 2018-01-01 PROCEDURE — 74011636320 HC RX REV CODE- 636/320: Performed by: RADIOLOGY

## 2018-01-01 PROCEDURE — 84100 ASSAY OF PHOSPHORUS: CPT | Performed by: FAMILY MEDICINE

## 2018-01-01 PROCEDURE — 70496 CT ANGIOGRAPHY HEAD: CPT

## 2018-01-01 PROCEDURE — 84443 ASSAY THYROID STIM HORMONE: CPT | Performed by: EMERGENCY MEDICINE

## 2018-01-01 PROCEDURE — G8979 MOBILITY GOAL STATUS: HCPCS

## 2018-01-01 PROCEDURE — 82550 ASSAY OF CK (CPK): CPT | Performed by: FAMILY MEDICINE

## 2018-01-01 PROCEDURE — 70450 CT HEAD/BRAIN W/O DYE: CPT

## 2018-01-01 PROCEDURE — 80048 BASIC METABOLIC PNL TOTAL CA: CPT

## 2018-01-01 PROCEDURE — 82550 ASSAY OF CK (CPK): CPT | Performed by: EMERGENCY MEDICINE

## 2018-01-01 PROCEDURE — 70498 CT ANGIOGRAPHY NECK: CPT

## 2018-01-01 PROCEDURE — G8987 SELF CARE CURRENT STATUS: HCPCS

## 2018-01-01 PROCEDURE — 83735 ASSAY OF MAGNESIUM: CPT | Performed by: EMERGENCY MEDICINE

## 2018-01-01 PROCEDURE — 97116 GAIT TRAINING THERAPY: CPT

## 2018-01-01 PROCEDURE — 85730 THROMBOPLASTIN TIME PARTIAL: CPT | Performed by: EMERGENCY MEDICINE

## 2018-01-01 PROCEDURE — 85610 PROTHROMBIN TIME: CPT | Performed by: EMERGENCY MEDICINE

## 2018-01-01 PROCEDURE — 36415 COLL VENOUS BLD VENIPUNCTURE: CPT | Performed by: EMERGENCY MEDICINE

## 2018-01-01 PROCEDURE — 97165 OT EVAL LOW COMPLEX 30 MIN: CPT

## 2018-01-01 PROCEDURE — 77030011943

## 2018-01-01 PROCEDURE — 83735 ASSAY OF MAGNESIUM: CPT | Performed by: FAMILY MEDICINE

## 2018-01-01 PROCEDURE — 97161 PT EVAL LOW COMPLEX 20 MIN: CPT

## 2018-01-01 RX ORDER — LIDOCAINE HYDROCHLORIDE 10 MG/ML
3 INJECTION INFILTRATION; PERINEURAL
Status: COMPLETED | OUTPATIENT
Start: 2018-01-01 | End: 2018-01-01

## 2018-01-01 RX ORDER — SODIUM CHLORIDE 9 MG/ML
100 INJECTION, SOLUTION INTRAVENOUS CONTINUOUS
Status: DISCONTINUED | OUTPATIENT
Start: 2018-01-01 | End: 2018-01-01 | Stop reason: HOSPADM

## 2018-01-01 RX ORDER — QUETIAPINE FUMARATE 100 MG/1
400 TABLET, FILM COATED ORAL
Status: DISCONTINUED | OUTPATIENT
Start: 2018-01-01 | End: 2018-01-01 | Stop reason: HOSPADM

## 2018-01-01 RX ORDER — FLUOXETINE HYDROCHLORIDE 20 MG/1
80 CAPSULE ORAL DAILY
Status: DISCONTINUED | OUTPATIENT
Start: 2018-01-01 | End: 2018-01-01 | Stop reason: HOSPADM

## 2018-01-01 RX ORDER — POTASSIUM CHLORIDE 750 MG/1
40 TABLET, FILM COATED, EXTENDED RELEASE ORAL
Status: COMPLETED | OUTPATIENT
Start: 2018-01-01 | End: 2018-01-01

## 2018-01-01 RX ORDER — TRAMADOL HYDROCHLORIDE 50 MG/1
TABLET ORAL
Qty: 20 TAB | Refills: 0 | OUTPATIENT
Start: 2018-01-01 | End: 2019-01-01 | Stop reason: ALTCHOICE

## 2018-01-01 RX ORDER — SODIUM CHLORIDE 0.9 % (FLUSH) 0.9 %
5-10 SYRINGE (ML) INJECTION EVERY 8 HOURS
Status: DISCONTINUED | OUTPATIENT
Start: 2018-01-01 | End: 2018-01-01 | Stop reason: HOSPADM

## 2018-01-01 RX ORDER — LEVETIRACETAM 500 MG/1
1000 TABLET ORAL 2 TIMES DAILY
Status: DISCONTINUED | OUTPATIENT
Start: 2018-01-01 | End: 2018-01-01 | Stop reason: HOSPADM

## 2018-01-01 RX ORDER — HEPARIN SODIUM 5000 [USP'U]/ML
5000 INJECTION, SOLUTION INTRAVENOUS; SUBCUTANEOUS EVERY 8 HOURS
Status: DISCONTINUED | OUTPATIENT
Start: 2018-01-01 | End: 2018-01-01 | Stop reason: HOSPADM

## 2018-01-01 RX ORDER — MELOXICAM 15 MG/1
TABLET ORAL
Qty: 90 TAB | Refills: 3 | Status: SHIPPED | OUTPATIENT
Start: 2018-01-01 | End: 2019-01-01 | Stop reason: SDUPTHER

## 2018-01-01 RX ORDER — SODIUM CHLORIDE 0.9 % (FLUSH) 0.9 %
5-10 SYRINGE (ML) INJECTION AS NEEDED
Status: DISCONTINUED | OUTPATIENT
Start: 2018-01-01 | End: 2018-01-01 | Stop reason: HOSPADM

## 2018-01-01 RX ORDER — LEVETIRACETAM 1000 MG/1
TABLET ORAL
Qty: 180 TAB | OUTPATIENT
Start: 2018-01-01

## 2018-01-01 RX ORDER — LANOLIN ALCOHOL/MO/W.PET/CERES
400 CREAM (GRAM) TOPICAL 2 TIMES DAILY
Status: COMPLETED | OUTPATIENT
Start: 2018-01-01 | End: 2018-01-01

## 2018-01-01 RX ORDER — BUPROPION HYDROCHLORIDE 75 MG/1
75 TABLET ORAL 2 TIMES DAILY
Status: DISCONTINUED | OUTPATIENT
Start: 2018-01-01 | End: 2018-01-01 | Stop reason: HOSPADM

## 2018-01-01 RX ORDER — ATORVASTATIN CALCIUM 20 MG/1
20 TABLET, FILM COATED ORAL
Status: DISCONTINUED | OUTPATIENT
Start: 2018-01-01 | End: 2018-01-01 | Stop reason: HOSPADM

## 2018-01-01 RX ORDER — CLONAZEPAM 1 MG/1
1 TABLET ORAL
Status: DISCONTINUED | OUTPATIENT
Start: 2018-01-01 | End: 2018-01-01 | Stop reason: HOSPADM

## 2018-01-01 RX ORDER — LEVETIRACETAM 1000 MG/1
1000 TABLET ORAL 2 TIMES DAILY
Qty: 180 TAB | Refills: 1 | Status: SHIPPED | OUTPATIENT
Start: 2018-01-01

## 2018-01-01 RX ORDER — ACETAMINOPHEN 325 MG/1
650 TABLET ORAL
Status: DISCONTINUED | OUTPATIENT
Start: 2018-01-01 | End: 2018-01-01 | Stop reason: HOSPADM

## 2018-01-01 RX ORDER — SODIUM CHLORIDE 0.9 % (FLUSH) 0.9 %
10 SYRINGE (ML) INJECTION
Status: COMPLETED | OUTPATIENT
Start: 2018-01-01 | End: 2018-01-01

## 2018-01-01 RX ADMIN — SODIUM CHLORIDE 100 ML/HR: 900 INJECTION, SOLUTION INTRAVENOUS at 11:01

## 2018-01-01 RX ADMIN — FLUOXETINE 80 MG: 20 CAPSULE ORAL at 09:22

## 2018-01-01 RX ADMIN — Medication 10 ML: at 16:58

## 2018-01-01 RX ADMIN — Medication 400 MG: at 17:00

## 2018-01-01 RX ADMIN — HEPARIN SODIUM 5000 UNITS: 5000 INJECTION INTRAVENOUS; SUBCUTANEOUS at 23:12

## 2018-01-01 RX ADMIN — CLONAZEPAM 1 MG: 1 TABLET ORAL at 16:57

## 2018-01-01 RX ADMIN — Medication 10 ML: at 21:00

## 2018-01-01 RX ADMIN — IOHEXOL 4 ML: 300 INJECTION, SOLUTION INTRAVENOUS at 09:18

## 2018-01-01 RX ADMIN — ACETAMINOPHEN 650 MG: 325 TABLET ORAL at 08:05

## 2018-01-01 RX ADMIN — LIDOCAINE HYDROCHLORIDE 3 ML: 10 INJECTION, SOLUTION INFILTRATION; PERINEURAL at 09:18

## 2018-01-01 RX ADMIN — BUPROPION HYDROCHLORIDE 75 MG: 75 TABLET, FILM COATED ORAL at 17:00

## 2018-01-01 RX ADMIN — BUPROPION HYDROCHLORIDE 75 MG: 75 TABLET, FILM COATED ORAL at 09:22

## 2018-01-01 RX ADMIN — Medication 10 ML: at 23:13

## 2018-01-01 RX ADMIN — QUETIAPINE FUMARATE 400 MG: 100 TABLET ORAL at 21:00

## 2018-01-01 RX ADMIN — LEVETIRACETAM 1000 MG: 500 TABLET ORAL at 09:33

## 2018-01-01 RX ADMIN — Medication 400 MG: at 09:22

## 2018-01-01 RX ADMIN — POTASSIUM CHLORIDE 40 MEQ: 750 TABLET, FILM COATED, EXTENDED RELEASE ORAL at 09:22

## 2018-01-01 RX ADMIN — SODIUM CHLORIDE 1000 ML: 900 INJECTION, SOLUTION INTRAVENOUS at 17:29

## 2018-01-01 RX ADMIN — BUPROPION HYDROCHLORIDE 75 MG: 75 TABLET, FILM COATED ORAL at 09:33

## 2018-01-01 RX ADMIN — CLONAZEPAM 1 MG: 1 TABLET ORAL at 09:33

## 2018-01-01 RX ADMIN — SODIUM CHLORIDE 100 ML/HR: 900 INJECTION, SOLUTION INTRAVENOUS at 23:19

## 2018-01-01 RX ADMIN — HEPARIN SODIUM 5000 UNITS: 5000 INJECTION INTRAVENOUS; SUBCUTANEOUS at 08:05

## 2018-01-01 RX ADMIN — HEPARIN SODIUM 5000 UNITS: 5000 INJECTION INTRAVENOUS; SUBCUTANEOUS at 06:48

## 2018-01-01 RX ADMIN — LEVETIRACETAM 1000 MG: 500 TABLET ORAL at 17:01

## 2018-01-01 RX ADMIN — FLUOXETINE 80 MG: 20 CAPSULE ORAL at 09:33

## 2018-01-01 RX ADMIN — SODIUM BICARBONATE 1 ML: 0.2 INJECTION, SOLUTION INTRAVENOUS at 09:18

## 2018-01-01 RX ADMIN — HEPARIN SODIUM 5000 UNITS: 5000 INJECTION INTRAVENOUS; SUBCUTANEOUS at 15:30

## 2018-01-01 RX ADMIN — IOPAMIDOL 100 ML: 755 INJECTION, SOLUTION INTRAVENOUS at 21:03

## 2018-01-01 RX ADMIN — LEVETIRACETAM 1000 MG: 500 TABLET ORAL at 09:22

## 2018-01-01 RX ADMIN — SODIUM CHLORIDE 100 ML: 900 INJECTION, SOLUTION INTRAVENOUS at 21:03

## 2018-01-01 RX ADMIN — Medication 10 ML: at 21:03

## 2018-01-01 RX ADMIN — ATORVASTATIN CALCIUM 20 MG: 20 TABLET, FILM COATED ORAL at 21:00

## 2018-01-01 RX ADMIN — CLONAZEPAM 1 MG: 1 TABLET ORAL at 09:22

## 2018-01-03 ENCOUNTER — TELEPHONE (OUTPATIENT)
Dept: INTERNAL MEDICINE CLINIC | Age: 58
End: 2018-01-03

## 2018-01-03 NOTE — TELEPHONE ENCOUNTER
Patient thinks she needs a Cortisone inj in left shoulder. Had one years ago but didn't know if Dr. Jannie Salazar could give it to her or if she needs to be referred to a specialist.  Please call.

## 2018-01-03 NOTE — TELEPHONE ENCOUNTER
Spoke with pt - states she had cortisone injections many years ago for her shoulder - which helped. shoulder pain has worsened and she wanted appt for MD to give her injection. Advised pt Dr Humberto Starks does not do these injections. She does not remember who her ortho was in past. Requesting MD for referral to ortho.  Will forward to MD.

## 2018-01-10 ENCOUNTER — TELEPHONE (OUTPATIENT)
Dept: INTERNAL MEDICINE CLINIC | Age: 58
End: 2018-01-10

## 2018-01-11 ENCOUNTER — TELEPHONE (OUTPATIENT)
Dept: INTERNAL MEDICINE CLINIC | Age: 58
End: 2018-01-11

## 2018-01-11 NOTE — TELEPHONE ENCOUNTER
Pt saw Dr Kimmy Eastman yesterday and given steroid injection in left shoulder. It was painful from the time of injection but he told her it should improve. It did not and she asked him for something for the pain. He advised her to contact her PCP due to her multiple medications. He did suggest Tramadol to her but did not want to prescribe it for her.  Will forward to MD.

## 2018-01-11 NOTE — TELEPHONE ENCOUNTER
Pt would like to discuss the pain in her shoulder. She went to  Ortho  yesterday and it did not help .  Can you call in a script to help her sleep phone [phone]

## 2018-01-12 NOTE — TELEPHONE ENCOUNTER
Spoke with pt - advised her MD wants her to schedule appt prior to him prescribing any pain medication. Scheduled her on 1/17/18 at 11:45 am. She states she will take meloxicam for pain until she can see MD. Hortensia Valadez if pain worsens prior to her appt to go to ER.  Will forward to MD.

## 2018-01-17 ENCOUNTER — OFFICE VISIT (OUTPATIENT)
Dept: INTERNAL MEDICINE CLINIC | Age: 58
End: 2018-01-17

## 2018-01-17 VITALS
SYSTOLIC BLOOD PRESSURE: 118 MMHG | HEART RATE: 88 BPM | HEIGHT: 70 IN | DIASTOLIC BLOOD PRESSURE: 82 MMHG | OXYGEN SATURATION: 97 % | WEIGHT: 157.4 LBS | BODY MASS INDEX: 22.54 KG/M2 | RESPIRATION RATE: 16 BRPM | TEMPERATURE: 97.9 F

## 2018-01-17 DIAGNOSIS — M75.102 ROTATOR CUFF SYNDROME OF LEFT SHOULDER: Primary | ICD-10-CM

## 2018-01-17 DIAGNOSIS — E55.9 VITAMIN D DEFICIENCY: ICD-10-CM

## 2018-01-17 PROBLEM — F33.9 RECURRENT DEPRESSION (HCC): Status: ACTIVE | Noted: 2018-01-17

## 2018-01-17 RX ORDER — CHOLECALCIFEROL (VITAMIN D3) 125 MCG
2 CAPSULE ORAL DAILY
COMMUNITY
End: 2018-01-17 | Stop reason: SDUPTHER

## 2018-01-17 RX ORDER — TRAMADOL HYDROCHLORIDE 50 MG/1
50 TABLET ORAL
Qty: 20 TAB | Refills: 1 | Status: SHIPPED | OUTPATIENT
Start: 2018-01-17 | End: 2018-04-01 | Stop reason: SDUPTHER

## 2018-01-17 RX ORDER — QUETIAPINE FUMARATE 400 MG/1
400 TABLET, FILM COATED ORAL
COMMUNITY

## 2018-01-17 RX ORDER — CHOLECALCIFEROL (VITAMIN D3) 125 MCG
1 CAPSULE ORAL DAILY
Qty: 30 TAB | Refills: 11
Start: 2018-01-17

## 2018-01-17 NOTE — MR AVS SNAPSHOT
[address] [facility] [address] [facility] 
[phone] Patient: Darline Shah MRN: [id number] Visit Information Date & Time Provider Department Dept. Phone Encounter #  
 1/17/2018 11:45 AM Wayne Meehan MD [employer] [facility] Internal Medicine [phone] [id number] Follow-up Instructions Return if symptoms worsen or fail to improve. Your Appointments 3/23/2018  2:00 PM  
New Patient with MD Martha Powell 75 ([facility]) Appt Note: NP \" Liver Panels Incorrect,Fatty Liver, gastric Bypass, referral Dr. Valentino Reno [phone], Cp$0, spt 12/18/17 [facility] [phone] [address] [place] [address]  
[phone]  
  
   
 [address] [facility] 505 [facility] [address]  
  
    
 9/5/2018 10:00 AM  
COMPLETE PHYSICAL with Wayne Meehan MD  
[employer] [facility] Internal Medicine [facility]) Appt Note: cpe  
 [address] [facility] [address]  
[address] [facility] [place] 57 Upcoming Health Maintenance Date Due DTaP/Tdap/Td series (1 - Tdap) 2/14/1982 BREAST CANCER SCRN MAMMOGRAM 10/11/2018 PAP AKA CERVICAL CYTOLOGY 3/7/2020 COLONOSCOPY 11/14/2021 Allergies as of 1/17/2018  Review Complete On: 1/17/2018 By: Wayne Meehan MD  
  
 Severity Noted Reaction Type Reactions Aspirin High 07/22/2010    Other (comments) Has had gastric bypass, and three brain surgeries. Ibuprofen High 07/22/2010    Other (comments) Gastric bypass surgeon advised not to take . Hydrocodone  07/22/2010    Other (comments) Not allergic Percocet [Oxycodone-acetaminophen]  07/26/2010    Other (comments) Not allergic Prednisone  09/01/2017    Other (comments) Equilibrium off Current Immunizations  Reviewed on 8/28/2017 Name Date Influenza Nasal Vaccine 10/3/2015 Influenza Vaccine 8/25/2017, 9/9/2016, 9/30/2015, 10/31/2013 Pneumococcal Conjugate (PCV-13) 11/8/2016 Pneumococcal Polysaccharide (PPSV-23) 10/31/2015 Not reviewed this visit You Were Diagnosed With   
  
 Codes Comments Rotator cuff syndrome of left shoulder    -  Primary ICD-10-CM: M75.102 ICD-9-CM: 726.10 Vitamin D deficiency     ICD-10-CM: E55.9 ICD-9-CM: 268.9 Vitals BP Pulse Temp Resp Height(growth percentile) Weight(growth percentile) 118/82 (BP 1 Location: Right arm, BP Patient Position: Sitting) 88 97.9 °F (36.6 °C) (Oral) 16 5' 10\" (1.778 m) 157 lb 6.4 oz (71.4 kg) SpO2 BMI OB Status Smoking Status 97% 22.58 kg/m2 Hysterectomy Current Every Day Smoker BMI and BSA Data Body Mass Index Body Surface Area  
 22.58 kg/m 2 1.88 m 2 Preferred Pharmacy Pharmacy Name Phone [address] [place] [address] [address] [facility] 134 Your Updated Medication List  
  
   
This list is accurate as of: 1/17/18 12:19 PM.  Always use your most recent med list.  
  
  
  
  
 buPROPion 75 mg tablet Commonly known as:  [facility] Take  by mouth two (2) times a day. butalbital-acetaminophen-caffeine -40 mg per tablet Commonly known as:  FIORICET, ESGIC  
TAKE 1 TABLET BY MOUTH EVERY 4 HOURS AS NEEDED FOR PAIN  
  
 cholecalciferol (vitamin D3) 2,000 unit Tab Commonly known as:  VITAMIN D3 Take 1 Tab by mouth daily. clonazePAM 1 mg tablet Commonly known as:  Dayan Bimler Take 1 mg by mouth two (2) times a day. FLUoxetine 40 mg capsule Commonly known as:  PROzac Take 80 mg by mouth daily. IRON PO Take 1,000 mg by mouth daily. levETIRAcetam 1,000 mg tablet TAKE 1 TABLET BY MOUTH TWO  TIMES DAILY  
  
 meloxicam 15 mg tablet Commonly known as:  MOBIC  
TAKE 1 TABLET BY MOUTH  DAILY (REPLACES IBUPROFEN)  
  
 multivitamin tablet Commonly known as:  ONE A DAY  
 Take 1 Tab by mouth daily. potassium 99 mg tablet Take 99 mg by mouth daily. PROBIOTIC BLEND PO Take 1 Cap by mouth daily. promethazine 25 mg tablet Commonly known as:  PHENERGAN Take 1 tablet by mouth  every 8 hours as needed for nausea QUEtiapine 400 mg tablet Commonly known as:  SEROquel Take 400 mg by mouth nightly. traMADol 50 mg tablet Commonly known as:  ULTRAM  
Take 1 Tab by mouth nightly. Max Daily Amount: 50 mg.  
  
  
  
  
Prescriptions Printed Refills  
 traMADol (ULTRAM) 50 mg tablet 1 Sig: Take 1 Tab by mouth nightly. Max Daily Amount: 50 mg.  
 Class: Print Route: Oral  
  
We Performed the Following REFERRAL TO ORTHOPEDICS [SQL953 Custom] Follow-up Instructions Return if symptoms worsen or fail to improve. Referral Information Referral ID Referred By Referred To  
  
 [id number] Tommie LOUIS MD   
   [facility] [address] [facility], [address] Phone: [phone] Fax: [phone] Visits Status Start Date End Date 1 New Request 1/17/18 1/17/19 If your referral has a status of pending review or denied, additional information will be sent to support the outcome of this decision. Introducing [facility]! Sheldon Cardenas introduces Endoclear patient portal. Now you can access parts of your medical record, email your doctor's office, and request medication refills online. 1. In your internet browser, go to [email or web address] 2. Click on the First Time User? Click Here link in the Sign In box. You will see the New Member Sign Up page. 3. Enter your Endoclear Access Code exactly as it appears below. You will not need to use this code after youve completed the sign-up process. If you do not sign up before the expiration date, you must request a new code. · Endoclear Access Code: [email or web address] Expires: 4/17/2018 11:04 AM 
 
 4. Enter the last four digits of your Social Security Number (xxxx) and Date of Birth (mm/dd/yyyy) as indicated and click Submit. You will be taken to the next sign-up page. 5. Create a Sagge ID. This will be your Sagge login ID and cannot be changed, so think of one that is secure and easy to remember. 6. Create a Sagge password. You can change your password at any time. 7. Enter your Password Reset Question and Answer. This can be used at a later time if you forget your password. 8. Enter your e-mail address. You will receive e-mail notification when new information is available in [address] 9. Click Sign Up. You can now view and download portions of your medical record. 10. Click the Download Summary menu link to download a portable copy of your medical information. If you have questions, please visit the Frequently Asked Questions section of the Sagge website. Remember, Sagge is NOT to be used for urgent needs. For medical emergencies, dial 911. Now available from your iPhone and Android! Please provide this summary of care documentation to your next provider. Your primary care clinician is listed as STACEY LOUIS. If you have any questions after today's visit, please call [phone].

## 2018-01-17 NOTE — PROGRESS NOTES
HISTORY OF PRESENT ILLNESS  Kari Burgess is a 65 y.o. female. Shoulder Pain    The history is provided by the patient (recently dx with rotator cuff syndrome, saw DR Devan Rojas. Modest relief with meloxicam and steroid injection). Incident onset: 4 mos + There was no injury mechanism. The left shoulder is affected. The pain is at a severity of 5/10. The pain has been fluctuating since onset. The pain does not radiate. There is a history of shoulder injury. There is no history of shoulder surgery. Associated symptoms include muscle weakness. Pertinent negatives include no numbness and no tingling. Review of Systems   Musculoskeletal: Positive for joint pain. Neurological: Negative for tingling and numbness. Physical Exam   Constitutional: No distress. Musculoskeletal:        Left shoulder: She exhibits decreased range of motion, tenderness and bony tenderness. She exhibits no swelling, no deformity and no spasm. Nursing note and vitals reviewed. ASSESSMENT and PLAN  Diagnoses and all orders for this visit:    1. Rotator cuff syndrome of left shoulder  -     traMADol (ULTRAM) 50 mg tablet; Take 1 Tab by mouth nightly. Max Daily Amount: 50 mg.  -     REFERRAL TO ORTHOPEDICS    2. Vitamin D deficiency  -     cholecalciferol, vitamin D3, (VITAMIN D3) 2,000 unit tab; Take 1 Tab by mouth daily.

## 2018-01-17 NOTE — PROGRESS NOTES
Pt. Is here for left shoulder pain, radiates down her arm and her hand cramps up. Pt. sa an Orthopedist last week and got a cortisone injection and xray. Pt. Has missed her seroquel x6d now waiting on mail order.

## 2018-03-23 ENCOUNTER — OFFICE VISIT (OUTPATIENT)
Dept: HEMATOLOGY | Age: 58
End: 2018-03-23

## 2018-03-23 VITALS
OXYGEN SATURATION: 97 % | WEIGHT: 162.6 LBS | HEIGHT: 70 IN | SYSTOLIC BLOOD PRESSURE: 129 MMHG | TEMPERATURE: 97.4 F | HEART RATE: 81 BPM | BODY MASS INDEX: 23.28 KG/M2 | DIASTOLIC BLOOD PRESSURE: 72 MMHG

## 2018-03-23 DIAGNOSIS — I67.1 BRAIN ANEURYSM: ICD-10-CM

## 2018-03-23 DIAGNOSIS — R94.5 ABNORMAL RESULTS OF LIVER FUNCTION STUDIES: ICD-10-CM

## 2018-03-23 DIAGNOSIS — R74.8 ELEVATED LIVER ENZYMES: Primary | ICD-10-CM

## 2018-03-23 PROBLEM — Z98.84 H/O GASTRIC BYPASS: Status: ACTIVE | Noted: 2018-03-23

## 2018-03-23 PROBLEM — Z90.710 S/P TAH (TOTAL ABDOMINAL HYSTERECTOMY): Status: ACTIVE | Noted: 2018-03-23

## 2018-03-23 PROBLEM — Z90.49 S/P CHOLECYSTECTOMY: Status: ACTIVE | Noted: 2018-03-23

## 2018-03-23 RX ORDER — CHOLECALCIFEROL (VITAMIN D3) 125 MCG
5 CAPSULE ORAL
COMMUNITY
End: 2018-05-03

## 2018-03-23 RX ORDER — ACETAMINOPHEN 500 MG
TABLET ORAL
COMMUNITY
End: 2018-01-01

## 2018-03-23 RX ORDER — GUAIFENESIN 400 MG/1
TABLET ORAL EVERY 4 HOURS
COMMUNITY
End: 2018-05-03 | Stop reason: ALTCHOICE

## 2018-03-23 RX ORDER — LOPERAMIDE HCL 2 MG
2 TABLET ORAL
COMMUNITY

## 2018-03-23 NOTE — PROGRESS NOTES
70 Kaylee Ceballos MD, [address], [place], [address] [facility]       Ashanti Dhaliwal, ZAC Beck, TAMICA Dejesus, [facility]   Danya Tony, ZAC Rhodes, ZAC Bergeron [facility] 136    at [facility]    [address] [facility], [address] [place] [address], [facility] 22.    [phone]    FAX: [address] [place] [address], [address]    [phone]    FAX: [phone]         Patient Care Team:  Yuniel Segovia MD as PCP - General (Internal Medicine)      Problem List  Date Reviewed: 3/26/2018          Codes Class Noted    S/P MICHELLE (total abdominal hysterectomy) ICD-10-CM: Z90.710  ICD-9-CM: V88.01  3/23/2018        H/O gastric bypass ICD-10-CM: Z98.890  ICD-9-CM: V45.86  3/23/2018        S/P cholecystectomy ICD-10-CM: Z90.49  ICD-9-CM: V45.79  3/23/2018        Recurrent depression ([facility] 75.) ICD-10-CM: F33.9  ICD-9-CM: 296.30  1/17/2018        Scotoma involving central area in visual field ICD-10-CM: H53.419  ICD-9-CM: 368.41  12/30/2016        Chronic headache ICD-10-CM: R51  ICD-9-CM: 784.0  12/30/2016        Congenital hydrocephalus ([facility] 75.) ICD-10-CM: Q03.9  ICD-9-CM: 742.3  12/30/2016        Depression ICD-10-CM: F32.9  ICD-9-CM: 972  12/30/2016        Memory loss ICD-10-CM: R41.3  ICD-9-CM: 780.93  12/30/2016        Myopia ICD-10-CM: H52.10  ICD-9-CM: 367.1  5/26/2016        Posterior vitreous detachment ICD-10-CM: H43.819  ICD-9-CM: 379.21  5/26/2016        Chronic nausea ICD-10-CM: R11.0  ICD-9-CM: 787.02  10/21/2014        Vitamin D deficiency ICD-10-CM: E55.9  ICD-9-CM: 268.9  7/10/2014        Seizure disorder ([facility].) ICD-10-CM: G40.909  ICD-9-CM: 345.90  10/3/2012        Hypoglycemia, unspecified ICD-10-CM: E16.2  ICD-9-CM: 251.2  10/3/2012        UTI (lower urinary tract infection) ICD-10-CM: N39.0  ICD-9-CM: 599.0  10/3/2012        Anxiety disorder ICD-10-CM: F41.9  ICD-9-CM: 300.00  Unknown        Brain aneurysm ICD-10-CM: I67.1  ICD-9-CM: 437.3  2/12/2010    Overview Signed 3/23/2018  2:57 PM by Sandy Wiggins MD     Treated with surgical resection. 2004             Hyperlipidemia ICD-10-CM: E78.5  ICD-9-CM: 272.4  2/12/2010        Elevated liver enzymes ICD-10-CM: R74.8  ICD-9-CM: 790.5  2/12/2010        Insomnia ICD-10-CM: G47.00  ICD-9-CM: 780.52  2/12/2010                The physicians listed above have asked me to see Aparna Merlos in consultation regarding elevated liver enzymes and its management. All medical records sent by the referring physicians were reviewed including imaging studies     The patient is a 63 y.o.  female who was noted to have abnormalities in liver transaminases and alkaline phosphate in 2014. Serologic evaluation for markers of chronic liver disease were either not performed or available to me. Ultrasound of the liver was performed in 9/2017. The results of the imaging suggested chronic liver disease. An assessment of liver fibrosis with biopsy or elastography has not been performed. The most recent laboratory studies indicate that the liver transaminases are normal, ALP is elevated, tests of hepatic synthetic and metabolic function are normal, and the platelet count is   Normal.    The patient tells me she had a fatty liver. She underwent gastric bypass and lost from Weight 210 to 162. She now has a normal BMI. The patient has no symptoms which could be attributed to the liver disorder. The patient has limitations in functional activities secondary to other medical problems that are not related to the liver disease.       The patient has not experienced pain in the right side over the liver,       ALLERGIES  Allergies   Allergen Reactions    Aspirin Other (comments)     Has had gastric bypass, and three brain surgeries.  Ibuprofen Other (comments)     Gastric bypass surgeon advised not to take .  Hydrocodone Other (comments)     Not allergic    Percocet [Oxycodone-Acetaminophen] Other (comments)     Not allergic    Prednisone Other (comments)     Equilibrium off        MEDICATIONS  Current Outpatient Prescriptions   Medication Sig    IRON,CARBONYL (PERFECT IRON PO) Take  by mouth.  melatonin tab tablet Take 5 mg by mouth nightly. 10 mg    loperamide (IMMODIUM) 2 mg tablet Take 2 mg by mouth four (4) times daily as needed for Diarrhea.  acetaminophen (TYLENOL) 500 mg tablet Take  by mouth every six (6) hours as needed for Pain.  guaiFENesin (ORGANIDIN) 400 mg tablet Take  by mouth every four (4) hours.  QUEtiapine (SEROQUEL) 400 mg tablet Take 400 mg by mouth nightly.  potassium 99 mg tablet Take 99 mg by mouth daily.  cholecalciferol, vitamin D3, (VITAMIN D3) 2,000 unit tab Take 1 Tab by mouth daily.  levETIRAcetam 1,000 mg tablet TAKE 1 TABLET BY MOUTH TWO  TIMES DAILY    meloxicam (MOBIC) 15 mg tablet TAKE 1 TABLET BY MOUTH  DAILY (REPLACES IBUPROFEN)    promethazine (PHENERGAN) 25 mg tablet Take 1 tablet by mouth  every 8 hours as needed for nausea    buPROPion (WELLBUTRIN) 75 mg tablet Take  by mouth two (2) times a day.  multivitamin (ONE A DAY) tablet Take 1 Tab by mouth daily.  L. ACID/L. CASEI/B.BIF/B.ANNABEL/FOS (PROBIOTIC BLEND PO) Take 1 Cap by mouth daily.  FLUoxetine (PROZAC) 40 mg capsule Take 80 mg by mouth daily.  butalbital-acetaminophen-caffeine (FIORICET, ESGIC) -40 mg per tablet TAKE 1 TABLET BY MOUTH EVERY 4 HOURS AS NEEDED FOR PAIN    clonazepam (KLONOPIN) 1 mg tablet Take 1 mg by mouth two (2) times a day.  traMADol (ULTRAM) 50 mg tablet Take 1 Tab by mouth nightly. Max Daily Amount: 50 mg.    FERROUS SULFATE (IRON PO) Take 1,000 mg by mouth daily. No current facility-administered medications for this visit. SYSTEM REVIEW NOT RELATED TO LIVER DISEASE OR REVIEWED ABOVE:  Constitution systems: Negative for fever, chills, weight gain, weight loss. Eyes: Blurred vision. ENT: Negative for sore throat, painful swallowing. Respiratory: Negative for cough, hemoptysis, SOB. Cardiology: Negative for chest pain, palpitations. GI:  Negative for constipation or diarrhea. : Negative for urinary frequency, dysuria, hematuria, nocturia. Skin: Negative for rash. Hematology: Negative for easy bruising, blood clots. Musculo-skelatal: Negative for back pain, muscle pain, weakness. Neurologic: Chronic headaches, No seizure in many years. Psychology: Insomnia. depression. FAMILY HISTORY:  The father had multiple cancers. The mother has DM and CKD. There is no family history of liver disease. SOCIAL HISTORY:  The patient is . The patient has 2 children,   The patient currently smokes half pack of tobacco daily. The patient has never consumed significant amounts of alcohol. The patient used to work at the [email or web address]. The patient is currently receiving disability. PHYSICAL EXAMINATION:  Visit Vitals    /72 (BP 1 Location: Left arm, BP Patient Position: Sitting)    Pulse 81    Temp 97.4 °F (36.3 °C) (Tympanic)    Ht 5' 10\" (1.778 m)    Wt 162 lb 9.6 oz (73.8 kg)    SpO2 97%    BMI 23.33 kg/m2     General: No acute distress. Eyes: Sclera anicteric. ENT: No oral lesions. Thyroid normal.  Nodes: No adenopathy. Skin: No spider angiomata. No jaundice. No palmar erythema. Respiratory: Lungs clear to auscultation. Cardiovascular: Regular heart rate. No murmurs. No JVD. Abdomen: Soft non-tender. Liver size normal to percussion/palpation. Spleen not palpable. No obvious ascites. Extremities: No edema. No muscle wasting. No gross arthritic changes. Neurologic: Alert and oriented. Cranial nerves grossly intact. No asterixis.     LABORATORY STUDIES:  Liver [place] of [address] Units 3/23/2018 12/4/2017   WBC 3.4 - 10.8 x10E3/uL 9.7    ANC 1.4 - 7.0 x10E3/uL 5.3    HGB 11.1 - 15.9 g/dL 13.4     - 379 x10E3/uL 377    INR 0.8 - 1.2 1.0    AST 0 - 40 IU/L 14 95 (H)   ALT 0 - 32 IU/L 10 106 (H)   Alk Phos 39 - 117 IU/L 220 (H) 292 (H)   Bili, Total 0.0 - 1.2 mg/dL 0.2 0.2   Bili, Direct 0.00 - 0.40 mg/dL 0.09 0.09   Albumin 3.5 - 5.5 g/dL 4.5 3.8   BUN 6 - 24 mg/dL 9    Creat 0.57 - 1.00 mg/dL 0.64    Na 134 - 144 mmol/L 139    K 3.5 - 5.2 mmol/L 4.6    Cl 96 - 106 mmol/L 97    CO2 18 - 29 mmol/L 29    Glucose 65 - 99 mg/dL 81      SEROLOGIES:  Serologies Latest Ref Rng & Units 3/23/2018   Hep A Ab, Total Negative Negative   Hep B Surface Ag Negative Negative   Hep B Core Ab, Total Negative Negative   Hep B Surface AB QL  Non Reactive   Hep C Ab 0.0 - 0.9 s/co ratio <0.1   Ferritin 15 - 150 ng/mL 39   TRUDY, IFA  Negative   ASMCA 0 - 19 Units 13   M2 Ab 0.0 - 20.0 Units 7.6   Alpha-1 antitrypsin level 90 - 200 mg/dL 144     LIVER HISTOLOGY:  Not available or performed    ENDOSCOPIC PROCEDURES:  Not available or performed    RADIOLOGY:  9/2017. Ultrasound of liver. Echogenic consistent with chronic liver disease. No liver mass lesions. No dilated bile ducts. No ascites. OTHER TESTING:  Not available or performed    ASSESSMENT AND PLAN:  Persistent elevation in alkaline phosphate with intermittent elevations in AST and ALT. Liver function is normal.  The platelet count is normal.      Based upon laboratory studies and imaging the patient appears to have significant liver injury. Serologic testing to help define the cause of the laboratory abnormality were all negative. The most likely causes for the liver chemistry abnormalities were discussed with the patient and include fatty liver disease,     Will perform laboratory testing to monitor liver function and degree of liver injury.   This included BMP, hepatic panel, CBC with platelet count, INR. The need to perform a liver biopsy to help determine the cause and severity of the liver test abnormalities was discussed. The risks of performing the liver biopsy including pain, puncture of the lung, gallbladder, intestine or kidney and bleeding were discussed. Will defer liver biopsy for now. The need to perform an assessment of liver fibrosis was discussed with the patient. The Fibroscan can assess liver fibrosis and determine if a patient has advanced fibrosis or cirrhosis without the need for liver biopsy. The Fibroscan is currently available at liver [place]. This will be performed at the next office visit. The patient was directed to continue all current medications at the current dosages. There are no contraindications for the patient to take any medications that are necessary for treatment of other medical issues. The patient was counseled regarding alcohol consumption. Vaccination for viral hepatitis A and B is recommended since the patient has no serologic evidence of previous exposure or vaccination with immunity. All of the above issues were discussed with the patient. All questions were answered. The patient expressed a clear understanding of the above. [address] [place] [address] in 4 weeks for Fibroscan, to review all data and determine the treatment plan.     Marizol Modi MD  Liver [place] [address] [place] [address] [facility] Centermanas[address] Cox StreetJayla  22.  [phone]

## 2018-03-23 NOTE — PROGRESS NOTES
Chief Complaint   Patient presents with    New Patient     Fatty Liver     Visit Vitals    /72 (BP 1 Location: Left arm, BP Patient Position: Sitting)    Pulse 81    Temp 97.4 °F (36.3 °C) (Tympanic)    Ht 5' 10\" (1.778 m)    Wt 162 lb 9.6 oz (73.8 kg)    SpO2 97%    BMI 23.33 kg/m2     PHQ over the last two weeks 10/5/2016   PHQ Not Done Active Diagnosis of Depression or Bipolar Disorder   Little interest or pleasure in doing things -   Feeling down, depressed or hopeless -   Total Score PHQ 2 -

## 2018-03-23 NOTE — MR AVS SNAPSHOT
[address] [facility] [phone] [address] 
[phone] Patient: Gisella Hunter MRN: [id number] IVS:[id number] Visit Information Date & Time Provider Department Dept. Phone Encounter #  
 3/23/2018  2:00 PM Reji Burgosana 47 of [facility] [place] [facility] [place] [id number] Follow-up Instructions Return in about 4 weeks (around 4/20/2018) for KEAGAN MENDEZ. Your Appointments 9/5/2018 10:00 AM  
Complete Physical with Elbridge Burkitt, MD  
[address] Internal Medicine [facility]) Appt Note: cpe  
 [address] [facility] [address]  
[address] [address] [place] [address] Upcoming Health Maintenance Date Due DTaP/Tdap/Td series (1 - Tdap) 2/14/1982 MEDICARE YEARLY EXAM 3/14/2018 BREAST CANCER SCRN MAMMOGRAM 10/11/2018 PAP AKA CERVICAL CYTOLOGY 3/7/2020 COLONOSCOPY 11/14/2021 Allergies as of 3/23/2018  Review Complete On: 3/23/2018 By: Corinna Hodgson LPN Severity Noted Reaction Type Reactions Aspirin High 07/22/2010    Other (comments) Has had gastric bypass, and three brain surgeries. Ibuprofen High 07/22/2010    Other (comments) Gastric bypass surgeon advised not to take . Hydrocodone  07/22/2010    Other (comments) Not allergic Percocet [Oxycodone-acetaminophen]  07/26/2010    Other (comments) Not allergic Prednisone  09/01/2017    Other (comments) Equilibrium off Current Immunizations  Reviewed on 8/28/2017 Name Date Influenza Nasal Vaccine 10/3/2015 Influenza Vaccine 8/25/2017, 9/9/2016, 9/30/2015, 10/31/2013 Pneumococcal Conjugate (PCV-13) 11/8/2016 Pneumococcal Polysaccharide (PPSV-23) 10/31/2015 Not reviewed this visit You Were Diagnosed With   
  
 Codes Comments Elevated liver enzymes    -  Primary ICD-10-CM: R74.8 ICD-9-CM: 790.5 Brain aneurysm     ICD-10-CM: I67.1 ICD-9-CM: 437.3 Abnormal results of liver function studies     ICD-10-CM: R94.5 ICD-9-CM: 794.8 Vitals BP Pulse Temp Height(growth percentile) Weight(growth percentile) 129/72 (BP 1 Location: Left arm, BP Patient Position: Sitting) 81 97.4 °F (36.3 °C) (Tympanic) 5' 10\" (1.778 m) 162 lb 9.6 oz (73.8 kg) SpO2 BMI OB Status Smoking Status 97% 23.33 kg/m2 Hysterectomy Current Every Day Smoker Vitals History BMI and BSA Data Body Mass Index Body Surface Area  
 23.33 kg/m 2 1.91 m 2 Preferred Pharmacy Pharmacy Name Phone 305 [facility], [address] [place] [address] [address] Wesley [address] 134 Your Updated Medication List  
  
   
This list is accurate as of 3/23/18  3:12 PM.  Always use your most recent med list.  
  
  
  
  
 acetaminophen 500 mg tablet Commonly known as:  TYLENOL Take  by mouth every six (6) hours as needed for Pain. buPROPion 75 mg tablet Commonly known as:  [facility] Take  by mouth two (2) times a day. butalbital-acetaminophen-caffeine -40 mg per tablet Commonly known as:  FIORICET, ESGIC  
TAKE 1 TABLET BY MOUTH EVERY 4 HOURS AS NEEDED FOR PAIN  
  
 cholecalciferol (vitamin D3) 2,000 unit Tab Commonly known as:  VITAMIN D3 Take 1 Tab by mouth daily. clonazePAM 1 mg tablet Commonly known as:  Suann Floyd Take 1 mg by mouth two (2) times a day. FLUoxetine 40 mg capsule Commonly known as:  PROzac Take 80 mg by mouth daily. guaiFENesin 400 mg tablet Commonly known as:  Burlene Saeid Take  by mouth every four (4) hours. IRON PO Take 1,000 mg by mouth daily. levETIRAcetam 1,000 mg tablet TAKE 1 TABLET BY MOUTH TWO  TIMES DAILY  
  
 loperamide 2 mg tablet Commonly known as:  IMMODIUM Take 2 mg by mouth four (4) times daily as needed for Diarrhea.  
  
 melatonin Tab tablet Take 5 mg by mouth nightly.  10 mg  
  
 meloxicam 15 mg tablet Commonly known as:  MOBIC  
TAKE 1 TABLET BY MOUTH  DAILY (REPLACES IBUPROFEN)  
  
 multivitamin tablet Commonly known as:  ONE A DAY Take 1 Tab by mouth daily. PERFECT IRON PO Take  by mouth.  
  
 potassium 99 mg tablet Take 99 mg by mouth daily. PROBIOTIC BLEND PO Take 1 Cap by mouth daily. promethazine 25 mg tablet Commonly known as:  PHENERGAN Take 1 tablet by mouth  every 8 hours as needed for nausea QUEtiapine 400 mg tablet Commonly known as:  SEROquel Take 400 mg by mouth nightly. traMADol 50 mg tablet Commonly known as:  ULTRAM  
Take 1 Tab by mouth nightly. Max Daily Amount: 50 mg. We Performed the Following ACTIN (SMOOTH MUSCLE) ANTIBODY Q4244180 CPT(R)] ALPHA-1-ANTITRYPSIN, TOTAL [53211 CPT(R)] ANTI-NEUTROPHIL CYTOPLASMIC AB G0208194 CPT(R)] ANTINUCLEAR ANTIBODIES, IFA Q3247347 CPT(R)] CBC WITH AUTOMATED DIFF [69248 CPT(R)] FERRITIN [52848 CPT(R)] HCV AB W/REFLEX VERIFICATION [[id number] Custom] HEP A AB, TOTAL Q3381110 CPT(R)] HEP B SURFACE AB X485103 CPT(R)] HEP B SURFACE AG [id number] CPT(R)] HEPATIC FUNCTION PANEL [84113 CPT(R)] HEPATITIS B CORE AB, TOTAL Z406416 CPT(R)] METABOLIC PANEL, BASIC [69360 CPT(R)] MITOCHONDRIAL M2 AB [id number] CPT(R)] PROTHROMBIN TIME + INR [48299 CPT(R)] Follow-up Instructions Return in about 4 weeks (around 4/20/2018) for KEAGAN MENDEZ. To-Do List   
 04/27/2018 1:15 PM  
  Appointment with Justin Rothman MD at [address] ([phone]) Patient Instructions FIBROSCAN PATIENT INFORMATION What is Fibroscan:? 
 
Fibroscan is an ultrasound device that measures liver stiffness by sending a pulse of vibrations through the liver.   This translated into an immediate result that can help your healthcare team determine the level of damage to the liver as well as monitor the condition of various liver diseases over time. Fibroscan is helpful in the evaluation of the following conditions: 
 
Chronic Hepatitis C Chronic Hepatitis B Fatty Liver Disease Alcohol Liver Disease Chronic Cholestatic Liver Diseases What happens During the Scan? Patients receiving this exam lie flat on an examination table and raise the right arm above the head. The skin over the right lower rib cage is exposed and the examiner locates the correct area to be scanned. The prove of the scanner is placed directly on the patient and triggered to start. This fells like a gentle flick against the skin and should not be uncomfortable. At least ten (10) readings are taken and the average is calculated to score the amount of liver stiffness or scar tissue. The exam should take 10-20 minutes. What do I need to do to prepare for the scan? Please do not eat or drink anything 2-4 hours  Before your Fibroscan. You should continue taking any prescribed medication and can take small sips of water or clear fluid to do so,  But avoid drinking large amounts of fluid. Please dress comfortably in clothes that will allow for easy access to the right side of the abdomen. Women are discouraged from wearing a dress on the day of the exam. 
 
Are there any special precautions? Patients who are pregnant or have an implantable device (for example, pacemaker or defibrillator) should not have this exam performed. Patients with a significant amount of fat tissue in the area the probe is pressed may be unable to have test performed. Introducing [facility] & HEALTH SERVICES! Chepe Alba introduces [employer] patient portal. Now you can access parts of your medical record, email your doctor's office, and request medication refills online. 1. In your internet browser, go to [email or web address]. [employer] 2. Click on the First Time User? Click Here link in the Sign In box. You will see the New Member Sign Up page. 3. Enter your "[employer] Access Code exactly as it appears below. You will not need to use this code after youve completed the sign-up process. If you do not sign up before the expiration date, you must request a new code. · "[employer] Access Code: [email or web address] Expires: 4/17/2018 12:04 PM 
 
4. Enter the last four digits of your Social Security Number (xxxx) and Date of Birth (mm/dd/yyyy) as indicated and click Submit. You will be taken to the next sign-up page. 5. Create a "[employer] ID. This will be your "[employer] login ID and cannot be changed, so think of one that is secure and easy to remember. 6. Create a "[employer] password. You can change your password at any time. 7. Enter your Password Reset Question and Answer. This can be used at a later time if you forget your password. 8. Enter your e-mail address. You will receive e-mail notification when new information is available in [address] 9. Click Sign Up. You can now view and download portions of your medical record. 10. Click the Download Summary menu link to download a portable copy of your medical information. If you have questions, please visit the Frequently Asked Questions section of the "[employer] website. Remember, "[employer] is NOT to be used for urgent needs. For medical emergencies, dial 911. Now available from your iPhone and Android! Please provide this summary of care documentation to your next provider. Your primary care clinician is listed as STACEY LOUIS. If you have any questions after today's visit, please call [phone].

## 2018-03-24 LAB
A1AT SERPL-MCNC: 144 MG/DL (ref 90–200)
ALBUMIN SERPL-MCNC: 4.5 G/DL (ref 3.5–5.5)
ALP SERPL-CCNC: 220 IU/L (ref 39–117)
ALT SERPL-CCNC: 10 IU/L (ref 0–32)
AST SERPL-CCNC: 14 IU/L (ref 0–40)
BASOPHILS # BLD AUTO: 0 X10E3/UL (ref 0–0.2)
BASOPHILS NFR BLD AUTO: 0 %
BILIRUB DIRECT SERPL-MCNC: 0.09 MG/DL (ref 0–0.4)
BILIRUB SERPL-MCNC: 0.2 MG/DL (ref 0–1.2)
BUN SERPL-MCNC: 9 MG/DL (ref 6–24)
BUN/CREAT SERPL: 14 (ref 9–23)
CALCIUM SERPL-MCNC: 10.2 MG/DL (ref 8.7–10.2)
CHLORIDE SERPL-SCNC: 97 MMOL/L (ref 96–106)
CO2 SERPL-SCNC: 29 MMOL/L (ref 18–29)
COMMENT, 144067: NORMAL
CREAT SERPL-MCNC: 0.64 MG/DL (ref 0.57–1)
EOSINOPHIL # BLD AUTO: 0.2 X10E3/UL (ref 0–0.4)
EOSINOPHIL NFR BLD AUTO: 2 %
ERYTHROCYTE [DISTWIDTH] IN BLOOD BY AUTOMATED COUNT: 13.4 % (ref 12.3–15.4)
FERRITIN SERPL-MCNC: 39 NG/ML (ref 15–150)
GFR SERPLBLD CREATININE-BSD FMLA CKD-EPI: 115 ML/MIN/1.73
GFR SERPLBLD CREATININE-BSD FMLA CKD-EPI: 99 ML/MIN/1.73
GLUCOSE SERPL-MCNC: 81 MG/DL (ref 65–99)
HAV AB SER QL IA: NEGATIVE
HBV CORE AB SERPL QL IA: NEGATIVE
HBV SURFACE AB SER QL: NON REACTIVE
HBV SURFACE AG SERPL QL IA: NEGATIVE
HCT VFR BLD AUTO: 40.8 % (ref 34–46.6)
HCV AB S/CO SERPL IA: <0.1 S/CO RATIO (ref 0–0.9)
HGB BLD-MCNC: 13.4 G/DL (ref 11.1–15.9)
IMM GRANULOCYTES # BLD: 0 X10E3/UL (ref 0–0.1)
IMM GRANULOCYTES NFR BLD: 0 %
INR PPP: 1 (ref 0.8–1.2)
LYMPHOCYTES # BLD AUTO: 3.5 X10E3/UL (ref 0.7–3.1)
LYMPHOCYTES NFR BLD AUTO: 36 %
MCH RBC QN AUTO: 31 PG (ref 26.6–33)
MCHC RBC AUTO-ENTMCNC: 32.8 G/DL (ref 31.5–35.7)
MCV RBC AUTO: 94 FL (ref 79–97)
MONOCYTES # BLD AUTO: 0.8 X10E3/UL (ref 0.1–0.9)
MONOCYTES NFR BLD AUTO: 8 %
NEUTROPHILS # BLD AUTO: 5.3 X10E3/UL (ref 1.4–7)
NEUTROPHILS NFR BLD AUTO: 54 %
PLATELET # BLD AUTO: 377 X10E3/UL (ref 150–379)
POTASSIUM SERPL-SCNC: 4.6 MMOL/L (ref 3.5–5.2)
PROT SERPL-MCNC: 7.4 G/DL (ref 6–8.5)
PROTHROMBIN TIME: 10.3 SEC (ref 9.1–12)
RBC # BLD AUTO: 4.32 X10E6/UL (ref 3.77–5.28)
SODIUM SERPL-SCNC: 139 MMOL/L (ref 134–144)
WBC # BLD AUTO: 9.7 X10E3/UL (ref 3.4–10.8)

## 2018-03-26 LAB
ACTIN IGG SERPL-ACNC: 13 UNITS (ref 0–19)
ANA TITR SER IF: NEGATIVE {TITER}
MITOCHONDRIA M2 IGG SER-ACNC: 7.6 UNITS (ref 0–20)

## 2018-03-28 LAB
C-ANCA TITR SER IF: NORMAL TITER
P-ANCA ATYPICAL TITR SER IF: NORMAL TITER
P-ANCA TITR SER IF: NORMAL TITER

## 2018-04-01 DIAGNOSIS — M75.102 ROTATOR CUFF SYNDROME OF LEFT SHOULDER: ICD-10-CM

## 2018-04-02 NOTE — TELEPHONE ENCOUNTER
Left detailed v/m for pt that MD is out of the office till mon-4/9 and to please call back to let us know if she has enough medication till last till then, if not will have to fwd msg to on call doc to see if can be approved.

## 2018-04-07 RX ORDER — TRAMADOL HYDROCHLORIDE 50 MG/1
TABLET ORAL
Qty: 20 TAB | Refills: 1 | OUTPATIENT
Start: 2018-04-07 | End: 2018-05-03 | Stop reason: ALTCHOICE

## 2018-04-08 DIAGNOSIS — M75.102 ROTATOR CUFF SYNDROME OF LEFT SHOULDER: ICD-10-CM

## 2018-04-10 RX ORDER — TRAMADOL HYDROCHLORIDE 50 MG/1
TABLET ORAL
Qty: 20 TAB | Refills: 1 | OUTPATIENT
Start: 2018-04-10

## 2018-04-13 DIAGNOSIS — M75.102 ROTATOR CUFF SYNDROME OF LEFT SHOULDER: ICD-10-CM

## 2018-04-13 RX ORDER — PROMETHAZINE HYDROCHLORIDE 25 MG/1
TABLET ORAL
Qty: 270 TAB | Refills: 3 | Status: SHIPPED | COMMUNITY
Start: 2018-04-13

## 2018-04-13 RX ORDER — TRAMADOL HYDROCHLORIDE 50 MG/1
TABLET ORAL
Qty: 20 TAB | Refills: 1 | OUTPATIENT
Start: 2018-04-13

## 2018-04-19 ENCOUNTER — TELEPHONE (OUTPATIENT)
Dept: INTERNAL MEDICINE CLINIC | Age: 58
End: 2018-04-19

## 2018-04-19 NOTE — TELEPHONE ENCOUNTER
Left detailed message on pt's private gerhard - MD received fax refill request for her clonazepam. He has never filled this medication for her - it comes from her psychiatrist. If she has any question - feel free to call office.

## 2018-05-02 ENCOUNTER — TELEPHONE (OUTPATIENT)
Dept: INTERNAL MEDICINE CLINIC | Age: 58
End: 2018-05-02

## 2018-05-02 NOTE — TELEPHONE ENCOUNTER
Spoke with pt - very bad connection - could not understand what she was telling me. She states she lives close by and would come to office.

## 2018-05-03 ENCOUNTER — OFFICE VISIT (OUTPATIENT)
Dept: INTERNAL MEDICINE CLINIC | Age: 58
End: 2018-05-03

## 2018-05-03 VITALS
SYSTOLIC BLOOD PRESSURE: 136 MMHG | HEART RATE: 90 BPM | OXYGEN SATURATION: 95 % | WEIGHT: 158.8 LBS | HEIGHT: 70 IN | BODY MASS INDEX: 22.73 KG/M2 | TEMPERATURE: 97.8 F | DIASTOLIC BLOOD PRESSURE: 78 MMHG | RESPIRATION RATE: 18 BRPM

## 2018-05-03 DIAGNOSIS — F43.21 GRIEF REACTION: Primary | ICD-10-CM

## 2018-05-03 DIAGNOSIS — F33.9 RECURRENT DEPRESSION (HCC): ICD-10-CM

## 2018-05-03 DIAGNOSIS — R74.8 ELEVATED LIVER ENZYMES: ICD-10-CM

## 2018-05-03 NOTE — MR AVS SNAPSHOT
[address] [facility] [address] [facility] [place] 
[phone] Patient: Jayda Peterson MRN: [id number]:[id number] Visit Information Date & Time Provider Department Dept. Phone Encounter #  
 5/3/2018  2:[facility] Medicine [phone] [id number] Follow-up Instructions Return in about 4 months (around 9/5/2018). Your Appointments 9/5/2018 10:00 AM  
Complete Physical with Halley Dacosta MD  
[address] [employer] [facility] Internal Medicine [facility]) Appt Note: cpe  
 [address] [facility] [address] [id number] [facility] [address] 350 [facility] [place] Upcoming Health Maintenance Date Due DTaP/Tdap/Td series (1 - Tdap) 2/14/1982 MEDICARE YEARLY EXAM 3/14/2018 Influenza Age 5 to Adult 8/1/2018 BREAST CANCER SCRN MAMMOGRAM 10/11/2018 PAP AKA CERVICAL CYTOLOGY 3/7/2020 COLONOSCOPY 11/14/2021 Allergies as of 5/3/2018  Review Complete On: 5/3/2018 By: Halley Dacosta MD  
  
 Severity Noted Reaction Type Reactions Aspirin High 07/22/2010    Other (comments) Has had gastric bypass, and three brain surgeries. Ibuprofen High 07/22/2010    Other (comments) Gastric bypass surgeon advised not to take . Hydrocodone  07/22/2010    Other (comments) Not allergic Percocet [Oxycodone-acetaminophen]  07/26/2010    Other (comments) Not allergic Prednisone  09/01/2017    Other (comments) Equilibrium off Current Immunizations  Reviewed on 8/28/2017 Name Date Influenza Nasal Vaccine 10/3/2015 Influenza Vaccine 8/25/2017, 9/9/2016, 9/30/2015, 10/31/2013 Pneumococcal Conjugate (PCV-13) 11/8/2016 Pneumococcal Polysaccharide (PPSV-23) 10/31/2015 Not reviewed this visit You Were Diagnosed With   
  
 Codes Comments Grief reaction    -  Primary ICD-10-CM: M43.53 ICD-9-CM: 309.0 Recurrent [facility]     ICD-10-CM: F33.9 ICD-9-CM: 296.30 Elevated liver enzymes     ICD-10-CM: R74.8 ICD-9-CM: 790.5 Vitals BP Pulse Temp Resp Height(growth percentile) Weight(growth percentile) 136/78 (BP 1 Location: Right arm, BP Patient Position: Sitting) 90 97.8 °F (36.6 °C) (Oral) 18 5' 10\" (1.778 m) 158 lb 12.8 oz (72 kg) SpO2 BMI OB Status Smoking Status 95% 22.79 kg/m2 Hysterectomy Current Every Day Smoker BMI and BSA Data Body Mass Index Body Surface Area  
 22.79 kg/m 2 1.89 m 2 Preferred Pharmacy Pharmacy Name Phone 305 [facility], [address] [place] [address] [address] 134 Your Updated Medication List  
  
   
This list is accurate as of 5/3/18  2:52 PM.  Always use your most recent med list.  
  
  
  
  
 acetaminophen 500 mg tablet Commonly known as:  TYLENOL Take  by mouth every six (6) hours as needed for Pain. buPROPion 75 mg tablet Commonly known as:  [facility] Take  by mouth two (2) times a day. butalbital-acetaminophen-caffeine -40 mg per tablet Commonly known as:  FIORICET, ESGIC  
TAKE 1 TABLET BY MOUTH EVERY 4 HOURS AS NEEDED FOR PAIN  
  
 cholecalciferol (vitamin D3) 2,000 unit Tab Commonly known as:  VITAMIN D3 Take 1 Tab by mouth daily. clonazePAM 1 mg tablet Commonly known as:  Cherylyn Rafter Take 1 mg by mouth two (2) times a day. FLUoxetine 40 mg capsule Commonly known as:  PROzac Take 80 mg by mouth daily. levETIRAcetam 1,000 mg tablet TAKE 1 TABLET BY MOUTH TWO  TIMES DAILY  
  
 loperamide 2 mg tablet Commonly known as:  IMMODIUM Take 2 mg by mouth four (4) times daily as needed for Diarrhea.  
  
 meloxicam 15 mg tablet Commonly known as:  MOBIC  
TAKE 1 TABLET BY MOUTH  DAILY (REPLACES IBUPROFEN)  
  
 multivitamin tablet Commonly known as:  ONE A DAY Take 1 Tab by mouth daily. PERFECT IRON PO Take 1,000 mg by mouth. potassium 99 mg tablet Take 99 mg by mouth daily. promethazine 25 mg tablet Commonly known as:  PHENERGAN Take 1 tablet by mouth  every 8 hours as needed for nausea QUEtiapine 400 mg tablet Commonly known as:  SEROquel Take 400 mg by mouth nightly. Follow-up Instructions Return in about 4 months (around 9/5/2018). Introducing [facility] & HEALTH SERVICES! Katia Caba introduces [employer] patient portal. Now you can access parts of your medical record, email your doctor's office, and request medication refills online. 1. In your internet browser, go to [email or web address]. [employer] 2. Click on the First Time User? Click Here link in the Sign In box. You will see the New Member Sign Up page. 3. Enter your [employer] Access Code exactly as it appears below. You will not need to use this code after youve completed the sign-up process. If you do not sign up before the expiration date, you must request a new code. · [employer] Access Code: [email or web address] Expires: 8/1/2018  2:24 PM 
 
4. Enter the last four digits of your Social Security Number (xxxx) and Date of Birth (mm/dd/yyyy) as indicated and click Submit. You will be taken to the next sign-up page. 5. Create a [employer] ID. This will be your [employer] login ID and cannot be changed, so think of one that is secure and easy to remember. 6. Create a [employer] password. You can change your password at any time. 7. Enter your Password Reset Question and Answer. This can be used at a later time if you forget your password. 8. Enter your e-mail address. You will receive e-mail notification when new information is available in [address] 9. Click Sign Up. You can now view and download portions of your medical record. 10. Click the Download Summary menu link to download a portable copy of your medical information.  
 
If you have questions, please visit the Frequently Asked Questions section of the [employer]. Remember, [employer] is NOT to be used for urgent needs. For medical emergencies, dial 911. Now available from your iPhone and Android! Please provide this summary of care documentation to your next provider. Your primary care clinician is listed as STACEY LOUIS. If you have any questions after today's visit, please call [phone].

## 2018-05-05 NOTE — PROGRESS NOTES
HISTORY OF PRESENT ILLNESS  Latha Tam is a 63 y.o. female. HPI Pascale Duff is seen today for evaluation of anxiety and other concerns. 1.  Anxiety. She has been under a significant amount of stress. She is undergoing divorce after 40 years of marriage and her father is terminally ill. I received information from the [facility] regarding a \"incident\" that would require forms to be completed. I had her come in to discuss this. Apparently after leaving her father one day she became overwhelmed with emotion and pulled over into a [employer] parking lot. She was seen by police and they were concerned about her mental status. She was very tearful. She did not receive a ticket and apparently there was no moving violation of any sort. She tells me she saw her psychiatrist today. No change in medication regimen was made. She sees Dr. Sravan Moran. 2.  Liver disease. She was a no-show for her follow up with Dr. Radha Dobbs. Apparently this occurred due to not being able to get to the appointment because of problems with her dad. I have advised her to reschedule. Family History: Notable for her father being terminal with cancer, she says he has eight weeks to live. Social History: Notable for divorce as noted above. We counseled for  20 minutes regarding anxiety, reviewed need for continued psychiatry follow up. I do not find her unstable to drive. I will complete forms. This is a 25 minute visit overall, greater than 50% of the visit was spent in counseling. Review of Systems   Psychiatric/Behavioral: The patient is nervous/anxious. Physical Exam   Constitutional: No distress. Psychiatric:   Some pressured speech, no psychosis or dangerous behavior exhibited   Nursing note and vitals reviewed. ASSESSMENT and PLAN  Diagnoses and all orders for this visit:    1. Grief reaction- See psychiatrist as directed     2. Recurrent depression ([address]- See psychiatrist as directed     3.  Elevated liver enzymes- See gastroenterologist as directed

## 2018-05-07 ENCOUNTER — TELEPHONE (OUTPATIENT)
Dept: INTERNAL MEDICINE CLINIC | Age: 58
End: 2018-05-07

## 2018-05-07 NOTE — TELEPHONE ENCOUNTER
pts dmv paper work has been faxed to [facility] at confirmed fax # [phone]. Fax confimation has been received and forms have been placed to be scanned to the pts chart.

## 2018-06-15 ENCOUNTER — OFFICE VISIT (OUTPATIENT)
Dept: HEMATOLOGY | Age: 58
End: 2018-06-15

## 2018-06-15 VITALS
DIASTOLIC BLOOD PRESSURE: 73 MMHG | HEIGHT: 70 IN | HEART RATE: 75 BPM | OXYGEN SATURATION: 97 % | BODY MASS INDEX: 23.48 KG/M2 | TEMPERATURE: 97.6 F | WEIGHT: 164 LBS | SYSTOLIC BLOOD PRESSURE: 114 MMHG

## 2018-06-15 DIAGNOSIS — R74.8 ELEVATED LIVER ENZYMES: Primary | ICD-10-CM

## 2018-06-15 NOTE — PROGRESS NOTES
70 Kaylee Ceballos MD, [address], [facility], [place]       Ravin Rosario, ZAC Davidson, PACOURTNEY Ragsdale, ACNP-BC   Ubaldo Wiseman, ZAC Pringle, ZAC Bergeron [facility] 136    at [place] [id number] [facility], [address].    [phone]    FAX: [address] [place] [address] Drive, [address], [address]    [phone]    FAX: [phone]         Patient Care Team:  France Laguna MD as PCP - General (Internal Medicine)      Problem List  Date Reviewed: 5/3/2018          Codes Class Noted    S/P MICHELLE (total abdominal hysterectomy) ICD-10-CM: Z90.710  ICD-9-CM: V88.01  3/23/2018        H/O gastric bypass ICD-10-CM: Z98.84  ICD-9-CM: V45.86  3/23/2018        S/P cholecystectomy ICD-10-CM: Z90.49  ICD-9-CM: V45.79  3/23/2018        Recurrent depression ([facility] 75.) ICD-10-CM: F33.9  ICD-9-CM: 296.30  1/17/2018        Scotoma involving central area in visual field ICD-10-CM: H53.419  ICD-9-CM: 368.41  12/30/2016        Chronic headache ICD-10-CM: R51  ICD-9-CM: 784.0  12/30/2016        Congenital hydrocephalus ([facility] 75.) ICD-10-CM: Q03.9  ICD-9-CM: 742.3  12/30/2016        Memory loss ICD-10-CM: R41.3  ICD-9-CM: 780.93  12/30/2016        Myopia ICD-10-CM: H52.10  ICD-9-CM: 367.1  5/26/2016        Posterior vitreous detachment ICD-10-CM: H43.819  ICD-9-CM: 379.21  5/26/2016        Chronic nausea ICD-10-CM: R11.0  ICD-9-CM: 787.02  10/21/2014        Vitamin D deficiency ICD-10-CM: E55.9  ICD-9-CM: 268.9  7/10/2014        Seizure disorder ([facility] ICD-10-CM: G40.909  ICD-9-CM: 345.90  10/3/2012        Hypoglycemia, unspecified ICD-10-CM: E16.2  ICD-9-CM: 251.2  10/3/2012        UTI (lower urinary tract infection) ICD-10-CM: N39.0  ICD-9-CM: 599.0 10/3/2012        Anxiety disorder ICD-10-CM: F41.9  ICD-9-CM: 300.00  Unknown        Brain aneurysm ICD-10-CM: I67.1  ICD-9-CM: 437.3  2/12/2010    Overview Signed 3/23/2018  2:57 PM by Naty Junior MD     Treated with surgical resection. 2004             Hyperlipidemia ICD-10-CM: E78.5  ICD-9-CM: 272.4  2/12/2010        Elevated liver enzymes ICD-10-CM: R74.8  ICD-9-CM: 790.5  2/12/2010        Insomnia ICD-10-CM: G47.00  ICD-9-CM: 780.52  9/20/2585              Darvin Hudson returns to the [place] [address] for management of elevated liver enzymes. The active problem list, all pertinent past medical history, medications, radiologic findings and laboratory findings related to the liver disorder were reviewed with the patient. The patient is a 63 y.o.  female who was noted to have abnormalities in liver transaminases and alkaline phosphate in 2014. She tells me she had a fatty liver. She underwent gastric bypass and lost from 210 to 162 pounds. She now has a normal BMI. Serologic evaluation for markers of chronic liver disease was negative. Ultrasound of the liver was performed in 9/2017. The results of the imaging suggested chronic liver disease. Assessment of liver fibrosis was performed in the office today with Fibroscan. The result was 4.5 kPa which correlates with no fibrosis. The patient has no symptoms which could be attributed to the liver disorder. The patient has limitations in functional activities secondary to other medical problems that are not related to the liver disease. The patient has not experienced pain in the right side over the liver,     All of the issues listed in the Assessment and Plan were discussed with the patient. All questions were answered. The patient expressed a clear understanding of the above. [address] in 6 months.     ASSESSMENT AND PLAN:  Persistent elevation in alkaline phosphate with intermittent elevations in AST and ALT. Liver function is normal.  The platelet count is normal.    Serologic testing for causes of chronic liver disease were negative. Based upon laboratory studies, Fibroscan and imaging the patient appears to have significant liver injury. There is no reason to perform liver biopsy at this time. The patient was obese, underwent gastric bypass surgery and then lost 42 pounds. The abnormal liver ultrasound probably reflects residual hepatic steatosis. The normal liver stiffness value by Fibroscan indicates that this is likely benign steatosis, not MELLO. The patient had been taking tegretol for siezues. This was stopped and she was switched to Promon in 1/2018. The liver transaminases declined to normal.  The ALP is declining. The most likely causes for the liver chemistry abnormalities is drug induced liver injury. This was not injuring the lvier as indicated y the normal Fibroscan. Will repeat liver enzymes in 6 months. If the ALP is normal at that time then no further follow-up at [facility] is required. IF the ALP remains elevated in 6 moth will see her back again in another 6 months with repeat Fibroscan. Treatment of other medical problems in patients with chronic liver disease  There are no contraindications for the patient to take any medications that are necessary for treatment of other medical issues. The patient does not consume alcohol daily. Normal doses of acetaminophen can be used for pain as needed. Normal doses of acetaminophen as recommended on the label are not hepatotoxic, even in patients with cirrhosis. The patient does not have cirrhosis. Normal doses of NSAIDs can be used for pain as needed. Counseling for alcohol in patients with chronic liver disease  The patient was counseled regarding alcohol consumption and the effect of alcohol on chronic liver disease.   The patient does not consume any significant amount of alcohol. Vaccinations   Vaccination for viral hepatitis A and B is recommended since the patient has no serologic evidence of previous exposure or vaccination with immunity. Routine vaccinations against other bacterial and viral agents can be performed as indicated. Annual flu vaccination should be administered if indicated. ALLERGIES  Allergies   Allergen Reactions    Aspirin Other (comments)     Has had gastric bypass, and three brain surgeries.  Ibuprofen Other (comments)     Gastric bypass surgeon advised not to take .  Hydrocodone Other (comments)     Not allergic    Percocet [Oxycodone-Acetaminophen] Other (comments)     Not allergic    Prednisone Other (comments)     Equilibrium off        MEDICATIONS  Current Outpatient Prescriptions   Medication Sig    promethazine (PHENERGAN) 25 mg tablet Take 1 tablet by mouth  every 8 hours as needed for nausea    IRON,CARBONYL (PERFECT IRON PO) Take 1,000 mg by mouth.  loperamide (IMMODIUM) 2 mg tablet Take 2 mg by mouth four (4) times daily as needed for Diarrhea.  acetaminophen (TYLENOL) 500 mg tablet Take  by mouth every six (6) hours as needed for Pain.  QUEtiapine (SEROQUEL) 400 mg tablet Take 400 mg by mouth nightly.  potassium 99 mg tablet Take 99 mg by mouth daily.  cholecalciferol, vitamin D3, (VITAMIN D3) 2,000 unit tab Take 1 Tab by mouth daily.  levETIRAcetam 1,000 mg tablet TAKE 1 TABLET BY MOUTH TWO  TIMES DAILY    meloxicam (MOBIC) 15 mg tablet TAKE 1 TABLET BY MOUTH  DAILY (REPLACES IBUPROFEN)    buPROPion (WELLBUTRIN) 75 mg tablet Take  by mouth two (2) times a day.  multivitamin (ONE A DAY) tablet Take 1 Tab by mouth daily.  FLUoxetine (PROZAC) 40 mg capsule Take 80 mg by mouth daily.  butalbital-acetaminophen-caffeine (FIORICET, ESGIC) -40 mg per tablet TAKE 1 TABLET BY MOUTH EVERY 4 HOURS AS NEEDED FOR PAIN    clonazepam (KLONOPIN) 1 mg tablet Take 1 mg by mouth two (2) times a day.      No current facility-administered medications for this visit. SYSTEM REVIEW NOT RELATED TO LIVER DISEASE OR REVIEWED ABOVE:  Constitution systems: Negative for fever, chills, weight gain, weight loss. Eyes: Blurred vision. ENT: Negative for sore throat, painful swallowing. Respiratory: Negative for cough, hemoptysis, SOB. Cardiology: Negative for chest pain, palpitations. GI:  Negative for constipation or diarrhea. : Negative for urinary frequency, dysuria, hematuria, nocturia. Skin: Negative for rash. Hematology: Negative for easy bruising, blood clots. Musculo-skelatal: Negative for back pain, muscle pain, weakness. Neurologic: Chronic headaches, No seizure in many years. Psychology: Insomnia. depression. FAMILY HISTORY:  The father had multiple cancers. The mother has DM and CKD. There is no family history of liver disease. SOCIAL HISTORY:  The patient is . The patient has 2 children,   The patient currently smokes half pack of tobacco daily. The patient has never consumed significant amounts of alcohol. The patient used to work at the [employer]. The patient is currently receiving disability. PHYSICAL EXAMINATION:  Visit Vitals    /73 (BP 1 Location: Left arm, BP Patient Position: Sitting)    Pulse 75    Temp 97.6 °F (36.4 °C) (Tympanic)    Ht 5' 10\" (1.778 m)    Wt 164 lb (74.4 kg)    SpO2 97%    BMI 23.53 kg/m2     General: No acute distress. Eyes: Sclera anicteric. ENT: No oral lesions. Thyroid normal.  Nodes: No adenopathy. Skin: No spider angiomata. No jaundice. No palmar erythema. Respiratory: Lungs clear to auscultation. Cardiovascular: Regular heart rate. No murmurs. No JVD. Abdomen: Soft non-tender. Liver size normal to percussion/palpation. Spleen not palpable. No obvious ascites. Extremities: No edema. No muscle wasting. No gross arthritic changes. Neurologic: Alert and oriented.   Cranial nerves grossly intact. No asterixis. LABORATORY STUDIES:  Liver [place] of [address] Units 3/23/2018 12/4/2017   WBC 3.4 - 10.8 x10E3/uL 9.7    ANC 1.4 - 7.0 x10E3/uL 5.3    HGB 11.1 - 15.9 g/dL 13.4     - 379 x10E3/uL 377    INR 0.8 - 1.2 1.0    AST 0 - 40 IU/L 14 95 (H)   ALT 0 - 32 IU/L 10 106 (H)   Alk Phos 39 - 117 IU/L 220 (H) 292 (H)   Bili, Total 0.0 - 1.2 mg/dL 0.2 0.2   Bili, Direct 0.00 - 0.40 mg/dL 0.09 0.09   Albumin 3.5 - 5.5 g/dL 4.5 3.8   BUN 6 - 24 mg/dL 9    Creat 0.57 - 1.00 mg/dL 0.64    Na 134 - 144 mmol/L 139    K 3.5 - 5.2 mmol/L 4.6    Cl 96 - 106 mmol/L 97    CO2 18 - 29 mmol/L 29    Glucose 65 - 99 mg/dL 81      SEROLOGIES:  Serologies Latest Ref Rng & Units 3/23/2018   Hep A Ab, Total Negative Negative   Hep B Surface Ag Negative Negative   Hep B Core Ab, Total Negative Negative   Hep B Surface AB QL  Non Reactive   Hep C Ab 0.0 - 0.9 s/co ratio <0.1   Ferritin 15 - 150 ng/mL 39   TRUDY, IFA  Negative   ASMCA 0 - 19 Units 13   M2 Ab 0.0 - 20.0 Units 7.6   Alpha-1 antitrypsin level 90 - 200 mg/dL 144     LIVER HISTOLOGY:  6/2018. FibroScan performed at [address]. EkPa was 4.5. IQR/med 33%. . The results suggested a fibrosis level of F0. ENDOSCOPIC PROCEDURES:  Not available or performed    RADIOLOGY:  9/2017. Ultrasound of liver. Echogenic consistent with chronic liver disease. No liver mass lesions. No dilated bile ducts. No ascites.     OTHER TESTING:  Not available or performed        Vishal Edge MD  [address] [place] [address] Nancy Yoon 19 of [address] [facility] [address] [address] [address]  [place] [address]  [address].  [phone]

## 2018-06-15 NOTE — PROGRESS NOTES
Chief Complaint   Patient presents with    Follow-up     Visit Vitals    /73 (BP 1 Location: Left arm, BP Patient Position: Sitting)    Pulse 75    Temp 97.6 °F (36.4 °C) (Tympanic)    Ht 5' 10\" (1.778 m)    Wt 164 lb (74.4 kg)    SpO2 97%    BMI 23.53 kg/m2     PHQ over the last two weeks 10/5/2016   PHQ Not Done Active Diagnosis of Depression or Bipolar Disorder   Little interest or pleasure in doing things -   Feeling down, depressed or hopeless -   Total Score PHQ 2 -     1. Have you been to the ER, urgent care clinic since your last visit? Hospitalized since your last visit? No    2. Have you seen or consulted any other health care providers outside of the [address] [place] [address] since your last visit? Include any pap smears or colon screening.  No

## 2018-06-16 LAB
ALBUMIN SERPL-MCNC: 4.4 G/DL (ref 3.5–5.5)
ALP SERPL-CCNC: 167 IU/L (ref 39–117)
ALT SERPL-CCNC: 13 IU/L (ref 0–32)
AST SERPL-CCNC: 22 IU/L (ref 0–40)
BILIRUB DIRECT SERPL-MCNC: 0.08 MG/DL (ref 0–0.4)
BILIRUB SERPL-MCNC: <0.2 MG/DL (ref 0–1.2)
PROT SERPL-MCNC: 7.1 G/DL (ref 6–8.5)

## 2018-06-26 NOTE — TELEPHONE ENCOUNTER
----- Message from Jessy Burgess MD sent at 6/16/2018  5:37 PM EDT -----  Tell her ALP is down. FU 6 months. Will hopefully be normal by then.

## 2018-06-26 NOTE — TELEPHONE ENCOUNTER
Left detailed voice message for patient to call  to rescheduled follow up appointment for 6 months from June, 2018 -per Dr. Janes Norris.

## 2018-07-09 NOTE — TELEPHONE ENCOUNTER
Advised pt MD received refill request on her Keppra - this should definitely come from her neurologist.

## 2018-07-09 NOTE — TELEPHONE ENCOUNTER
----- Message from Atha Oppenheim, MD sent at 7/7/2018  1:57 PM EDT -----  Regarding: acs  Call re Keppra refill- should definitely come from her neurologist

## 2018-07-19 NOTE — TELEPHONE ENCOUNTER
Called [facility] - spoke with Camilla Gutierrez in regards to this RX request - advised her this needs to filled by her neurologist Dr Ora Zazueta. She will correct in their system.

## 2018-07-26 NOTE — TELEPHONE ENCOUNTER
Darlin Chanel (Self) [phone] (H)     Pt requesting smoking cessation aid as she has rotator cuff surgery next month and surgeon told her they won't operate unless she quits smoking 3 weeks prior

## 2018-07-30 NOTE — TELEPHONE ENCOUNTER
Left detailed message she will need to schedule a visit to review this with MD. Please call to schedule.

## 2018-09-05 NOTE — MR AVS SNAPSHOT
[address] [facility] [address] [facility] 
[phone] Patient: Becky Lux MRN: [id number] [id number] Visit Information Date & Time Provider Department Dept. Phone Encounter #  
 9/5/2018 10:00 AM Jeffy Lara, [facility] [phone] [id number] Follow-up Instructions Return in about 1 year (around 9/5/2019), or if symptoms worsen or fail to improve. Your Appointments 9/17/2018  8:00 AM  
Follow Up with ZAC Rodriguez 75 ([facility] Appt Note: Follow up 200 [facility] [phone] [place] [address] [facility] [address]  
[address] [facility] [address] Upcoming Health Maintenance Date Due DTaP/Tdap/Td series (1 - Tdap) 2/14/1982 Influenza Age 5 to Adult 8/1/2018 BREAST CANCER SCRN MAMMOGRAM 10/11/2018 MEDICARE YEARLY EXAM 9/6/2019 PAP AKA CERVICAL CYTOLOGY 3/7/2020 COLONOSCOPY 11/14/2021 Allergies as of 9/5/2018  Review Complete On: 9/5/2018 By: Jeffy Lara MD  
  
 Severity Noted Reaction Type Reactions Aspirin High 07/22/2010    Other (comments) Has had gastric bypass, and three brain surgeries. Ibuprofen High 07/22/2010    Other (comments) Gastric bypass surgeon advised not to take . Hydrocodone  07/22/2010    Other (comments) Not allergic Percocet [Oxycodone-acetaminophen]  07/26/2010    Other (comments) Not allergic Prednisone  09/01/2017    Other (comments) Equilibrium off Current Immunizations  Reviewed on 8/28/2017 Name Date Influenza Nasal Vaccine 10/3/2015 Influenza Vaccine 8/25/2017, 9/9/2016, 9/30/2015, 10/31/2013 Pneumococcal Conjugate (PCV-13) 11/8/2016 Pneumococcal Polysaccharide (PPSV-23) 10/31/2015 Not reviewed this visit You Were Diagnosed With   
  
 Codes Comments Medicare annual wellness visit, subsequent    -  Primary ICD-10-CM: Z00.00 ICD-9-CM: V70.0 Post menopausal problems     ICD-10-CM: N95.9 ICD-9-CM: 627.9 Recurrent depression ([facility] [address]     ICD-10-CM: F33.9 ICD-9-CM: 296.30 Seizure disorder ([facility] [address]     ICD-10-CM: G40.909 ICD-9-CM: 345.90 Chronic nausea     ICD-10-CM: R11.0 ICD-9-CM: 787.02 Brain aneurysm     ICD-10-CM: I67.1 ICD-9-CM: 437.3 Elevated liver enzymes     ICD-10-CM: R74.8 ICD-9-CM: 790.5 Encounter for immunization     ICD-10-CM: O61 ICD-9-CM: V03.89 Screening for depression     ICD-10-CM: Z13.89 ICD-9-CM: V79.0 Vitals BP Pulse Temp Resp Height(growth percentile) Weight(growth percentile) 100/72 (BP 1 Location: Right arm, BP Patient Position: Sitting) 89 98.2 °F (36.8 °C) (Oral) 18 5' 10\" (1.778 m) 161 lb 9.6 oz (73.3 kg) SpO2 BMI OB Status Smoking Status 99% 23.19 kg/m2 Hysterectomy Current Every Day Smoker Vitals History BMI and BSA Data Body Mass Index Body Surface Area  
 23.19 kg/m 2 1.9 m 2 Preferred Pharmacy Pharmacy Name Phone 305 [facility], [address] [address] [place] [address] [address] [facility] Your Updated Medication List  
  
   
This list is accurate as of 9/5/18 11:02 AM.  Always use your most recent med list.  
  
  
  
  
 acetaminophen 500 mg tablet Commonly known as:  TYLENOL Take  by mouth every six (6) hours as needed for Pain. buPROPion 75 mg tablet Commonly known as:  [facility] Take  by mouth two (2) times a day. butalbital-acetaminophen-caffeine -40 mg per tablet Commonly known as:  FIORICET, ESGIC  
TAKE 1 TABLET BY MOUTH EVERY 4 HOURS AS NEEDED FOR PAIN  
  
 cholecalciferol (vitamin D3) 2,000 unit Tab Commonly known as:  VITAMIN D3 Take 1 Tab by mouth daily. clonazePAM 1 mg tablet Commonly known as:  John Farias Take 1 mg by mouth two (2) times a day. diph,pertuss(acel),tetanus vac(PF) 2 Lf-(2.5-5-3-5 mcg)-5Lf/0.5 mL Syrg vaccine Commonly known as:  ADACEL  
0.5 mL by IntraMUSCular route once for 1 dose. FLUoxetine 40 mg capsule Commonly known as:  PROzac Take 80 mg by mouth daily. levETIRAcetam 1,000 mg tablet TAKE 1 TABLET BY MOUTH TWO  TIMES DAILY  
  
 loperamide 2 mg tablet Commonly known as:  IMMODIUM Take 2 mg by mouth four (4) times daily as needed for Diarrhea.  
  
 meloxicam 15 mg tablet Commonly known as:  MOBIC  
TAKE 1 TABLET BY MOUTH  DAILY  
  
 multivitamin tablet Commonly known as:  ONE A DAY Take 1 Tab by mouth daily. PERFECT IRON PO Take 1,000 mg by mouth.  
  
 potassium 99 mg tablet Take 99 mg by mouth daily. promethazine 25 mg tablet Commonly known as:  PHENERGAN Take 1 tablet by mouth  every 8 hours as needed for nausea QUEtiapine 400 mg tablet Commonly known as:  SEROquel Take 400 mg by mouth nightly. Prescriptions Printed Refills diph,pertuss,acel,,tetanus vac,PF, (ADACEL) 2 Lf-(2.5-5-3-5 mcg)-5Lf/0.5 mL syrg vaccine 0 Si.5 mL by IntraMUSCular route once for 1 dose. Class: Print Route: IntraMUSCular We Performed the Following Gene 68 [[id number] [facility] [place]] Follow-up Instructions Return in about 1 year (around 2019), or if symptoms worsen or fail to improve. To-Do List   
 2018 Imaging:  DEXA BONE DENSITY STUDY AXIAL Patient Instructions Medicare Wellness Visit, Female The best way to live healthy is to have a lifestyle where you eat a well-balanced diet, exercise regularly, limit alcohol use, and quit all forms of tobacco/nicotine, if applicable. Regular preventive services are another way to keep healthy. Preventive services (vaccines, screening tests, monitoring & exams) can help personalize your care plan, which helps you manage your own care. Screening tests can find health problems at the earliest stages, when they are easiest to treat. [facility] follows the current, evidence-based guidelines published by the [facility] Vlad Rashaun (USPSTF) when recommending preventive services for our patients. Because we follow these guidelines, sometimes recommendations change over time as research supports it. (For example, mammograms used to be recommended annually. Even though Medicare will still pay for an annual mammogram, the newer guidelines recommend a mammogram every two years for women of average risk.) Of course, you and your doctor may decide to screen more often for some diseases, based on your risk and your health status. Preventive services for you include: - Medicare offers their members a free annual wellness visit, which is time for you and your primary care provider to discuss and plan for your preventive service needs. Take advantage of this benefit every year! 
-All adults over the age of 72 should receive the recommended pneumonia vaccines. Current USPSTF guidelines recommend a series of two vaccines for the best pneumonia protection.  
-All adults should have a flu vaccine yearly and a tetanus vaccine every 10 years. All adults age 61 and older should receive a shingles vaccine once in their lifetime.   
-A bone mass density test is recommended when a woman turns 65 to screen for osteoporosis. This test is only recommended one time, as a screening. Some providers will use this same test as a disease monitoring tool if you already have osteoporosis. -All adults age 38-68 who are overweight should have a diabetes screening test once every three years.  
-Other screening tests and preventive services for persons with diabetes include: an eye exam to screen for diabetic retinopathy, a kidney function test, a foot exam, and stricter control over your cholesterol. -Cardiovascular screening for adults with routine risk involves an electrocardiogram (ECG) at intervals determined by your doctor.  
-Colorectal cancer screenings should be done for adults age 54-65 with no increased risk factors for colorectal cancer. There are a number of acceptable methods of screening for this type of cancer. Each test has its own benefits and drawbacks. Discuss with your doctor what is most appropriate for you during your annual wellness visit. The different tests include: colonoscopy (considered the best screening method), a fecal occult blood test, a fecal DNA test, and sigmoidoscopy. -Breast cancer screenings are recommended every other year for women of normal risk, age 54-69. 
-Cervical cancer screenings for women over age 72 are only recommended with certain risk factors.  
-All adults born between [facility] should be screened once for Hepatitis C. Here is a list of your current Health Maintenance items (your personalized list of preventive services) with a due date: 
Health Maintenance Due Topic Date Due  
 DTaP/Tdap/Td  (1 - Tdap) 02/14/1982  Flu Vaccine  08/01/2018  Breast Cancer Screening  10/11/2018 Introducing [facility]! [address] [place] [address] introduces [facility] patient portal. Now you can access parts of your medical record, email your doctor's office, and request medication refills online. 1. In your internet browser, go to [email or web address]. [email or web address] 2. Click on the First Time User? Click Here link in the Sign In box. You will see the New Member Sign Up page. 3. Enter your [facility] Access Code exactly as it appears below. You will not need to use this code after youve completed the sign-up process. If you do not sign up before the expiration date, you must request a new code. · [facility] Access Code: [email or web address] Expires: 12/4/2018 11:02 AM 
 
4.  Enter the last four digits of your Social Security Number (xxxx) and Date of Birth (mm/dd/yyyy) as indicated and click Submit. You will be taken to the next sign-up page. 5. Create a [employer] ID. This will be your [employer] login ID and cannot be changed, so think of one that is secure and easy to remember. 6. Create a [employer] password. You can change your password at any time. 7. Enter your Password Reset Question and Answer. This can be used at a later time if you forget your password. 8. Enter your e-mail address. You will receive e-mail notification when new information is available in [address] 9. Click Sign Up. You can now view and download portions of your medical record. 10. Click the Download Summary menu link to download a portable copy of your medical information. If you have questions, please visit the Frequently Asked Questions section of the [employer] website. Remember, [employer] is NOT to be used for urgent needs. For medical emergencies, dial 911. Now available from your iPhone and Android! Please provide this summary of care documentation to your next provider. Your primary care clinician is listed as STACEY LOUIS. If you have any questions after today's visit, please call [phone].

## 2018-09-05 NOTE — PROGRESS NOTES
This is the Subsequent Medicare Annual Wellness Exam, performed 12 months or more after the Initial AWV or the last Subsequent AWV I have reviewed the patient's medical history in detail and updated the computerized patient record. History Past Medical History:  
Diagnosis Date  Anxiety disorder  Brain aneurysm 2/12/2010  
 Headache(784.0) 2/12/2010  Hyperlipidemia 2/12/2010  Insomnia 2/12/2010  LFT'S ABNORMAL 2/12/2010  Neurological disorder Brain anuruysm  Screen for colon cancer 11/13/2014  Seizures ([facility] [address] Past Surgical History:  
Procedure Laterality Date  HX CHOLECYSTECTOMY  HX COLONOSCOPY  2015  
 due 22  
 HX CRANIOTOMY And cerebral shunt, by Dr. Anna Boswell. Followed by Dr. Rolando Tinoco.  HX HERNIA REPAIR    
 HX PARTIAL HYSTERECTOMY  HX ROTATOR CUFF REPAIR Left 09/2018 Current Outpatient Prescriptions Medication Sig Dispense Refill  diph,pertuss,acel,,tetanus vac,PF, (ADACEL) 2 Lf-(2.5-5-3-5 mcg)-5Lf/0.5 mL syrg vaccine 0.5 mL by IntraMUSCular route once for 1 dose. 0.5 mL 0  
 meloxicam (MOBIC) 15 mg tablet TAKE 1 TABLET BY MOUTH  DAILY 90 Tab 3  promethazine (PHENERGAN) 25 mg tablet Take 1 tablet by mouth  every 8 hours as needed for nausea 270 Tab 3  
 IRON,CARBONYL (PERFECT IRON PO) Take 1,000 mg by mouth.  loperamide (IMMODIUM) 2 mg tablet Take 2 mg by mouth four (4) times daily as needed for Diarrhea.  acetaminophen (TYLENOL) 500 mg tablet Take  by mouth every six (6) hours as needed for Pain.  QUEtiapine (SEROQUEL) 400 mg tablet Take 400 mg by mouth nightly.  potassium 99 mg tablet Take 99 mg by mouth daily.  cholecalciferol, vitamin D3, (VITAMIN D3) 2,000 unit tab Take 1 Tab by mouth daily. 30 Tab 11  
 levETIRAcetam 1,000 mg tablet TAKE 1 TABLET BY MOUTH TWO  TIMES DAILY 180 Tab 1  
 buPROPion (WELLBUTRIN) 75 mg tablet Take  by mouth two (2) times a day.  multivitamin (ONE A DAY) tablet Take 1 Tab by mouth daily.  FLUoxetine (PROZAC) 40 mg capsule Take 80 mg by mouth daily.  butalbital-acetaminophen-caffeine (FIORICET, ESGIC) -40 mg per tablet TAKE 1 TABLET BY MOUTH EVERY 4 HOURS AS NEEDED FOR PAIN 40 Tab 0  clonazepam (KLONOPIN) 1 mg tablet Take 1 mg by mouth two (2) times a day. Allergies Allergen Reactions  Aspirin Other (comments) Has had gastric bypass, and three brain surgeries.  Ibuprofen Other (comments) Gastric bypass surgeon advised not to take .  Hydrocodone Other (comments) Not allergic  Percocet [Oxycodone-Acetaminophen] Other (comments) Not allergic  Prednisone Other (comments) Equilibrium off Family History Problem Relation Age of Onset  Diabetes Mother  Cancer Father  Cancer Maternal Grandmother  Heart Disease Maternal Grandfather Social History Substance Use Topics  Smoking status: Current Every Day Smoker Packs/day: 0.50 Years: 30.00 Types: Cigarettes  Smokeless tobacco: Never Used Comment: a few cigarettes daily  Alcohol use No  
 
Patient Active Problem List  
Diagnosis Code  Anxiety disorder F41.9  Brain aneurysm I67.1  Hyperlipidemia E78.5  Elevated liver enzymes R74.8  Insomnia G47.00  Seizure disorder ([address] G40.909  Hypoglycemia, unspecified E16.2  
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  
 Recurrent depression (HCC) F33.9  S/P MICHELLE (total abdominal hysterectomy) Z90.710  H/O gastric bypass Z98.84  
 S/P cholecystectomy Z90.49 Depression Risk Factor Screening: PHQ over the last two weeks 10/5/2016 PHQ Not Done Active Diagnosis of Depression or Bipolar Disorder Little interest or pleasure in doing things - Feeling down, depressed, irritable, or hopeless - Total Score PHQ 2 - Alcohol Risk Factor Screening: You do not drink alcohol or very rarely. Functional Ability and Level of Safety:  
Hearing Loss Hearing is good. Activities of Daily Living The home contains: no safety equipment. Patient does total self care Fall Risk No flowsheet data found. Abuse Screen Patient is not abused Cognitive Screening Evaluation of Cognitive Function: 
Has your family/caregiver stated any concerns about your memory: no 
Normal 
 
Patient Care Team  
Patient Care Team: Brianda Iglesias MD as PCP - General (Internal Medicine) Assessment/Plan Education and counseling provided: 
Are appropriate based on today's review and evaluation Diagnoses and all orders for this visit: 1. Post menopausal problems 2. Recurrent depression ([facility] [address] 3. Seizure disorder ([facility] [address] 4. Chronic nausea 5. Brain aneurysm 6. Elevated liver enzymes 7. Encounter for immunization 
-     diph,pertuss,acel,,tetanus vac,PF, (ADACEL) 2 Lf-(2.5-5-3-5 mcg)-5Lf/0.5 mL syrg vaccine; 0.5 mL by IntraMUSCular route once for 1 dose. 8. Medicare annual wellness visit, subsequent 9. Screening for depression -     Depression Screen Annual 
 
 
 
Health Maintenance Due Topic Date Due  
 DTaP/Tdap/Td series (1 - Tdap) 02/14/1982  Influenza Age 5 to Adult  08/01/2018  BREAST CANCER SCRN MAMMOGRAM  10/11/2018

## 2018-09-05 NOTE — PROGRESS NOTES
Chief Complaint Patient presents with Frausto.Mow Annual Wellness Visit  Grief Counseling  
  follow up  Depression  
  follow up Visit Vitals  /72 (BP 1 Location: Right arm, BP Patient Position: Sitting)  Pulse 89  Temp 98.2 °F (36.8 °C) (Oral)  Resp 18  Ht 5' 10\" (1.778 m)  Wt 161 lb 9.6 oz (73.3 kg)  SpO2 99%  BMI 23.19 kg/m2 1. Have you been to the ER, urgent care clinic since your last visit? Hospitalized since your last visit? no 
 
2. Have you seen or consulted any other health care providers outside of the [address] [place] [address] since your last visit? Include any pap smears or colon screening. [facility] for right shoulder surgery

## 2018-09-05 NOTE — PATIENT INSTRUCTIONS
Medicare Wellness Visit, Female The best way to live healthy is to have a lifestyle where you eat a well-balanced diet, exercise regularly, limit alcohol use, and quit all forms of tobacco/nicotine, if applicable. Regular preventive services are another way to keep healthy. Preventive services (vaccines, screening tests, monitoring & exams) can help personalize your care plan, which helps you manage your own care. Screening tests can find health problems at the earliest stages, when they are easiest to treat. [facility] follows the current, evidence-based guidelines published by the [facility] Vlad Rashaun ([facility]) when recommending preventive services for our patients. Because we follow these guidelines, sometimes recommendations change over time as research supports it. (For example, mammograms used to be recommended annually. Even though Medicare will still pay for an annual mammogram, the newer guidelines recommend a mammogram every two years for women of average risk.) Of course, you and your doctor may decide to screen more often for some diseases, based on your risk and your health status. Preventive services for you include: - Medicare offers their members a free annual wellness visit, which is time for you and your primary care provider to discuss and plan for your preventive service needs. Take advantage of this benefit every year! 
-All adults over the age of 72 should receive the recommended pneumonia vaccines. Current USPSTF guidelines recommend a series of two vaccines for the best pneumonia protection.  
-All adults should have a flu vaccine yearly and a tetanus vaccine every 10 years. All adults age 61 and older should receive a shingles vaccine once in their lifetime.   
-A bone mass density test is recommended when a woman turns 65 to screen for osteoporosis. This test is only recommended one time, as a screening. Some providers will use this same test as a disease monitoring tool if you already have osteoporosis. -All adults age 38-68 who are overweight should have a diabetes screening test once every three years.  
-Other screening tests and preventive services for persons with diabetes include: an eye exam to screen for diabetic retinopathy, a kidney function test, a foot exam, and stricter control over your cholesterol.  
-Cardiovascular screening for adults with routine risk involves an electrocardiogram (ECG) at intervals determined by your doctor.  
-Colorectal cancer screenings should be done for adults age 54-65 with no increased risk factors for colorectal cancer. There are a number of acceptable methods of screening for this type of cancer. Each test has its own benefits and drawbacks. Discuss with your doctor what is most appropriate for you during your annual wellness visit. The different tests include: colonoscopy (considered the best screening method), a fecal occult blood test, a fecal DNA test, and sigmoidoscopy. -Breast cancer screenings are recommended every other year for women of normal risk, age 54-69. 
-Cervical cancer screenings for women over age 72 are only recommended with certain risk factors.  
-All adults born between [facility] should be screened once for Hepatitis C. Here is a list of your current Health Maintenance items (your personalized list of preventive services) with a due date: 
Health Maintenance Due Topic Date Due  
 DTaP/Tdap/Td  (1 - Tdap) 02/14/1982  Flu Vaccine  08/01/2018  Breast Cancer Screening  10/11/2018

## 2018-09-05 NOTE — PROGRESS NOTES
HISTORY OF PRESENT ILLNESS Emily Esteban is a 63 y.o. female. HPI Clair Nyhan is seen today for a Medicare Wellness Visit and follow up of chronic problems. Preventive medicine. For Medicare Wellness Visit, see attached note. 1. She is here for select lab studies, new shingles vaccine and Tdap booster. 2. She is up to date with other vaccinations and mammogram. 
 
Chronic medical problems are reviewed. 1.  shunt, seizure disorder, major depression. Up to date with specialists, primarily at [facility]. I have reviewed the available notes. She is clinically stable at this time. 2. Abnormal LFT's. Up to date with hepatology follow up. Problem was felt to be due to seizure medication which was changed. Review of systems notable for 9/10 right shoulder pain. She is status post surgical repair. Social history notable for her undergoing a divorce. This has been stressful, but she feels it is for the best overall. Surgical history updated for rotator cuff repair. Review of Systems Constitutional: Negative for weight loss. Respiratory: Negative. Cardiovascular: Negative for chest pain, palpitations, leg swelling and PND. Gastrointestinal: Positive for nausea. Musculoskeletal: Positive for joint pain. Negative for myalgias. Neurological: Negative for focal weakness. Psychiatric/Behavioral: Positive for depression. The patient is nervous/anxious. Physical Exam  
Constitutional: She appears well-nourished. Neck: Carotid bruit is not present. Cardiovascular: Normal rate and regular rhythm. Exam reveals no gallop and no friction rub. No murmur heard. Pulmonary/Chest: Effort normal and breath sounds normal. No respiratory distress. Musculoskeletal: She exhibits no edema. Arms: 
Nursing note and vitals reviewed. ASSESSMENT and PLAN Diagnoses and all orders for this visit: 
 
1. Medicare annual wellness visit, subsequent 2. Post menopausal problems -     DEXA BONE DENSITY STUDY AXIAL; Future -     METABOLIC PANEL, BASIC 3. Recurrent depression ([facility] [address]- See psychiatrist as directed 4. Seizure disorder [facility])- See neurologist as directed 5. Chronic nausea- Continue current regimen of prescription and / or OTC medications 6. Brain aneurysm- See surgeon as discussed/directed 7. Elevated liver enzymes 
-     URINALYSIS W/ RFLX MICROSCOPIC 8. Encounter for immunization 
-     diph,pertuss,acel,,tetanus vac,PF, (ADACEL) 2 Lf-(2.5-5-3-5 mcg)-5Lf/0.5 mL syrg vaccine; 0.5 mL by IntraMUSCular route once for 1 dose. 9. Screening for depression -     Depression Screen Annual 
 
10. Mixed hyperlipidemia -     LIPID PANEL 
-     CBC WITH AUTOMATED DIFF

## 2018-09-18 PROBLEM — R41.82 ALTERED MENTAL STATUS: Status: ACTIVE | Noted: 2018-01-01

## 2018-09-18 NOTE — ED NOTES
Bedside shift change report given to Fransisca Gardner RN (oncoming nurse) by Jefry Mercado RN (offgoing nurse). Report included the following information SBAR, ED Summary, MAR and Recent Results.

## 2018-09-18 NOTE — IP AVS SNAPSHOT
[address] [facility] [address] [place] [address] 
[phone] Patient: Kailee Sterling MRN: [id number] UDO:2/83/0810 About your hospitalization You were admitted on:  September 18, 2018 You last received care in the:  [facility] 6S NEURO-SCI TELE You were discharged on:  September 20, 2018 Why you were hospitalized Your primary diagnosis was: Altered Mental Status Follow-up Information Follow up With Details Comments Contact Info Brianda Iglesias MD On 9/27/2018 Hospital PCP f/u appointment on Thursday 9/27 @ 3:25 p.m. [address] [place] [address] 
[phone] Your Scheduled Appointments Thursday September 27, 2018  3:25 PM EDT TRANSITIONAL CARE MANAGEMENT with Brianda Iglesias MD  
[facility] Internal Medicine [facility] [address] [place] [address]  
[phone] Discharge Orders None A check lina indicates which time of day the medication should be taken. My Medications CHANGE how you take these medications Instructions Each Dose to Equal  
 Morning Noon Evening Bedtime  
 levETIRAcetam 1,000 mg tablet What changed:  See the new instructions. Your last dose was: Your next dose is: Take 1 Tab by mouth two (2) times a day. 1000 mg CONTINUE taking these medications Instructions Each Dose to Equal  
 Morning Noon Evening Bedtime  
 acetaminophen 500 mg tablet Commonly known as:  TYLENOL Your last dose was: Your next dose is: Take  by mouth every six (6) hours as needed for Pain. buPROPion 75 mg tablet Commonly known as:  [facility] Your last dose was: Your next dose is: Take  by mouth two (2) times a day. butalbital-acetaminophen-caffeine -40 mg per tablet Commonly known as:  Kristen Fernandez Your last dose was: Your next dose is: TAKE 1 TABLET BY MOUTH EVERY 4 HOURS AS NEEDED FOR PAIN  
     
   
   
   
  
 cholecalciferol (vitamin D3) 2,000 unit Tab Commonly known as:  VITAMIN D3 Your last dose was: Your next dose is: Take 1 Tab by mouth daily. 1 Tab  
    
   
   
   
  
 clonazePAM 1 mg tablet Commonly known as:  Benjamin Juárez Your last dose was: Your next dose is: Take 1 mg by mouth two (2) times daily as needed (Anxiety). 1 mg FLUoxetine 40 mg capsule Commonly known as:  PROzac Your last dose was: Your next dose is: Take 80 mg by mouth daily. 80 mg  
    
   
   
   
  
 loperamide 2 mg tablet Commonly known as:  IMMODIUM Your last dose was: Your next dose is: Take 2 mg by mouth four (4) times daily as needed for Diarrhea.  
 2 mg  
    
   
   
   
  
 meloxicam 15 mg tablet Commonly known as:  MOBIC Your last dose was: Your next dose is: TAKE 1 TABLET BY MOUTH  DAILY  
     
   
   
   
  
 multivitamin tablet Commonly known as:  ONE A DAY Your last dose was: Your next dose is: Take 1 Tab by mouth daily. 1 Tab PERFECT IRON PO Your last dose was: Your next dose is: Take 1,000 mg by mouth. 1000 mg  
    
   
   
   
  
 potassium 99 mg tablet Your last dose was: Your next dose is: Take 99 mg by mouth daily. 99 mg  
    
   
   
   
  
 promethazine 25 mg tablet Commonly known as:  PHENERGAN Your last dose was: Your next dose is: Take 1 tablet by mouth  every 8 hours as needed for nausea QUEtiapine 400 mg tablet Commonly known as:  SEROquel Your last dose was: Your next dose is: Take 400 mg by mouth nightly. 400 mg Where to Get Your Medications These medications were sent to [address], [address] [facility] [address] [facility] [address], [facility] [address] Phone:  [phone]  
  levETIRAcetam 1,000 mg tablet Discharge Instructions Discharge Instructions PATIENT ID: Brittney Solitario MRN: [id number] YOB: 1960 DATE OF ADMISSION: 9/18/2018  4:50 PM   
DATE OF DISCHARGE: 9/20/2018 PRIMARY CARE PROVIDER: Kyle Sandhu MD  
 
ATTENDING PHYSICIAN: Mitchell Raines MD 
DISCHARGING PROVIDER: Mitchell Raines MD   
To contact this individual call [phone] and ask the  to page. If unavailable ask to be transferred the Adult Hospitalist Department. DISCHARGE DIAGNOSES alter mental status CONSULTATIONS: IP CONSULT TO HOSPITALIST 
IP CONSULT TO PSYCHIATRY PROCEDURES/SURGERIES: * No surgery found * PENDING TEST RESULTS:  
At the time of discharge the following test results are still pending: none FOLLOW UP APPOINTMENTS:  
Follow-up Information Follow up With Details Comments Contact Info Kyle Sandhu MD   [address] [facility] [address] [place] [address] 
[phone] ADDITIONAL CARE RECOMMENDATIONS:  
1. Resume routine activities 2. Follow up with PCP in 1 week 3. Fall precaution DIET: Regular Diet ACTIVITY: Activity as tolerated DISCHARGE MEDICATIONS: 
 See Medication Reconciliation Form · It is important that you take the medication exactly as they are prescribed. · Keep your medication in the bottles provided by the pharmacist and keep a list of the medication names, dosages, and times to be taken in your wallet. · Do not take other medications without consulting your doctor. NOTIFY YOUR PHYSICIAN FOR ANY OF THE FOLLOWING:  
Fever over 101 degrees for 24 hours. Chest pain, shortness of breath, fever, chills, nausea, vomiting, diarrhea, change in mentation, falling, weakness, bleeding. Severe pain or pain not relieved by medications. Or, any other signs or symptoms that you may have questions about. DISPOSITION: 
  Home With: 
 OT  PT  [place]  RN  
  
 SNF/Inpatient Rehab/LTAC Independent/assisted living Hospice  
x Other: CDMP Checked:  
Yes x PROBLEM LIST Updated: 
Yes x Signed: Lavinia Hall MD 
9/20/2018 10:05 AM 
 
ACO Transitions of Care Introducing [employer] [address] offers a voluntary care coordination program to provide high quality service and care to [facility] fee-for-service beneficiaries. Nida Angel was designed to help you enhance your health and well-being through the following services: ? Transitions of Care  support for individuals who are transitioning from one care setting to another (example: Hospital to home). ? Chronic and Complex Care Coordination  support for individuals and caregivers of those with serious or chronic illnesses or with more than one chronic (ongoing) condition and those who take a number of different medications. If you meet specific medical criteria, a [address] [place] [address] Rd may call you directly to coordinate your care with your primary care physician and your other care providers. For questions about the [facility] programs, please, contact your physicians office. For general questions or additional information about Accountable Care Organizations: 
Please visit www.medicare.gov/acos. html or call 1-800-MEDICARE (7-339.692.2957) TTY users should call 0-655.806.6466. [facility] EHR Announcement We are excited to announce that we are making your provider's discharge notes available to you in [facility] EHR.   You will see these notes when they are completed and signed by the physician that discharged you from your recent hospital stay. If you have any questions or concerns about any information you see in [employer], please call the Health Information Department where you were seen or reach out to your Primary Care Provider for more information about your plan of care. Introducing [facility]! Brayden Senior introduces [employer] patient portal. Now you can access parts of your medical record, email your doctor's office, and request medication refills online. 1. In your internet browser, go to [email or web address] at Home 2. Click on the First Time User? Click Here link in the Sign In box. You will see the New Member Sign Up page. 3. Enter your [employer] Access Code exactly as it appears below. You will not need to use this code after youve completed the sign-up process. If you do not sign up before the expiration date, you must request a new code. · [employer] Access Code: [email or web address] Expires: 12/4/2018 11:02 AM 
 
4. Enter the last four digits of your Social Security Number (xxxx) and Date of Birth (mm/dd/yyyy) as indicated and click Submit. You will be taken to the next sign-up page. 5. Create a [employer] ID. This will be your [employer] login ID and cannot be changed, so think of one that is secure and easy to remember. 6. Create a [employer] password. You can change your password at any time. 7. Enter your Password Reset Question and Answer. This can be used at a later time if you forget your password. 8. Enter your e-mail address. You will receive e-mail notification when new information is available in [address] 9. Click Sign Up. You can now view and download portions of your medical record. 10. Click the Download Summary menu link to download a portable copy of your medical information.  
 
If you have questions, please visit the Frequently Asked Questions section of the [employer]. Remember, MyChart is NOT to be used for urgent needs. For medical emergencies, dial 911. Now available from your iPhone and Android! Introducing Kenny Gutierrez As a [employer] patient, I wanted to make you aware of our electronic visit tool called Kenny Gutierrez. [employer] 24/7 allows you to connect within minutes with a medical provider 24 hours a day, seven days a week via a mobile device or tablet or logging into a secure website from your computer. You can access Kenny Gutierrez from anywhere in the [place]. A virtual visit might be right for you when you have a simple condition and feel like you just dont want to get out of bed, or cant get away from work for an appointment, when your regular [employer] provider is not available (evenings, weekends or holidays), or when youre out of town and need minor care. Electronic visits cost only $49 and if the [employer] 24/7 provider determines a prescription is needed to treat your condition, one can be electronically transmitted to a nearby pharmacy*. Please take a moment to enroll today if you have not already done so. The enrollment process is free and takes just a few minutes. To enroll, please download the [employer] 24/7 vignesh to your tablet or phone, or visit [email or web address] to enroll on your computer. And, as an [address] [place] [address] patient with a [employer] account, the results of your visits will be scanned into your electronic medical record and your primary care provider will be able to view the scanned results. We urge you to continue to see your regular [employer] provider for your ongoing medical care. And while your primary care provider may not be the one available when you seek a Kenny Gutierrez virtual visit, the peace of mind you get from getting a real diagnosis real time can be priceless. For more information on Kenny Joãomiguel, view our Frequently Asked Questions (FAQs) at [email or web address]. Sincerely, 
 
Del Mo MD 
Chief Medical Officer Mark Suazo *:  certain medications cannot be prescribed via Kenny Gutierrez Providers Seen During Your Hospitalization Provider Specialty Primary office phone Leah Lawson MD Emergency Medicine [phone] Jethro Rodriguse MD Hospitalist [phone] Edwina Whitfield MD Internal Medicine [phone] Sepideh Parsons MD Internal Medicine [phone] Your Primary Care Physician (PCP) Primary Care Physician Office Phone Office Fax James Cherry [phone] You are allergic to the following Allergen Reactions Aspirin Other (comments) Has had gastric bypass, and three brain surgeries. Ibuprofen Other (comments) Gastric bypass surgeon advised not to take . Prednisone Other (comments) Equilibrium off Recent Documentation Height Weight BMI OB Status Smoking Status 1.778 m 74.2 kg 23.47 kg/m2 Hysterectomy Current Every Day Smoker Emergency Contacts Name Discharge Info Relation Home Work Mobile [address] CAREGIVER [3] Spouse [3] [phone] Gilda Michellelene  Parent [1] Viviana,Anna  Other Relative [6] [phone] Patient Belongings The following personal items are in your possession at time of discharge: 
  Dental Appliances: None  Visual Aid: Glasses      Home Medications: None   Jewelry: None  Clothing: None    Other Valuables: Cell Phone Please provide this summary of care documentation to your next provider. Signatures-by signing, you are acknowledging that this After Visit Summary has been reviewed with you and you have received a copy. Patient Signature:  ____________________________________________________________ Date:  ____________________________________________________________  
  
Bengali Pih Provider Signature:  ____________________________________________________________ Date:  ____________________________________________________________

## 2018-09-18 NOTE — PROGRESS NOTES
Admission Medication Reconciliation: 
 
Information obtained from:  Patient/Mother/RxQuery/RxQuery Comments/Recommendations: Updated PTA meds/reviewed patient's allergies. 1)  Patient is a questionable historian. Patient's mother and RxQuery helped fill in the gaps. Mother plans to bring in medication list and bottles tomorrow. Per patient she has not taken any medications in 3 days; reports she was Brandy reading a book and taking care of her dog and grandchild. \" 2)  Medication changes (since last review): Added 
- none Adjusted - Klonopin to BID PRN (vs BID) Removed 
- None 3)  Patient NOT allergic to hydrocodone and oxycodone per her report and per Dr. Philip Deep review. Allergies to these medications were removed. Confirmed reaction to prednisone and avoidance to ASA and IBU post-gastric bypass. Of note, patient on Mobic per orthopedics s/p rotator cuff repair Allergies:  Aspirin; Ibuprofen; and Prednisone Significant PMH/Disease States:  
Past Medical History:  
Diagnosis Date  Anxiety disorder  Brain aneurysm 2/12/2010  
 Headache(784.0) 2/12/2010  Hyperlipidemia 2/12/2010  Insomnia 2/12/2010  LFT'S ABNORMAL 2/12/2010  Neurological disorder Brain anuruysm  Screen for colon cancer 11/13/2014  Seizures ([facility] [address] Chief Complaint for this Admission: Chief Complaint Patient presents with  Fall Prior to Admission Medications:  
Prior to Admission Medications Prescriptions Last Dose Informant Patient Reported? Taking? FLUoxetine (PROZAC) 40 mg capsule 9/15/2018  Yes Yes Sig: Take 80 mg by mouth daily. IRON,CARBONYL (PERFECT IRON PO) 9/15/2018  Yes Yes Sig: Take 1,000 mg by mouth. QUEtiapine (SEROQUEL) 400 mg tablet 9/15/2018  Yes Yes Sig: Take 400 mg by mouth nightly. acetaminophen (TYLENOL) 500 mg tablet   Yes Yes Sig: Take  by mouth every six (6) hours as needed for Pain. buPROPion (WELLBUTRIN) 75 mg tablet 9/15/2018  Yes Yes Sig: Take  by mouth two (2) times a day. butalbital-acetaminophen-caffeine (FIORICET, ESGIC) -40 mg per tablet 9/15/2018  No Yes Sig: TAKE 1 TABLET BY MOUTH EVERY 4 HOURS AS NEEDED FOR PAIN  
cholecalciferol, vitamin D3, (VITAMIN D3) 2,000 unit tab 9/15/2018  No Yes Sig: Take 1 Tab by mouth daily. clonazepam (KLONOPIN) 1 mg tablet 9/15/2018  Yes Yes Sig: Take 1 mg by mouth two (2) times daily as needed (Anxiety). levETIRAcetam 1,000 mg tablet 9/15/2018  No Yes Sig: TAKE 1 TABLET BY MOUTH TWO  TIMES DAILY  
loperamide (IMMODIUM) 2 mg tablet   Yes Yes Sig: Take 2 mg by mouth four (4) times daily as needed for Diarrhea.  
meloxicam (MOBIC) 15 mg tablet 9/15/2018  No Yes Sig: TAKE 1 TABLET BY MOUTH  DAILY  
multivitamin (ONE A DAY) tablet 9/15/2018  Yes Yes Sig: Take 1 Tab by mouth daily. potassium 99 mg tablet 9/15/2018  Yes Yes Sig: Take 99 mg by mouth daily. promethazine (PHENERGAN) 25 mg tablet   No Yes Sig: Take 1 tablet by mouth  every 8 hours as needed for nausea Facility-Administered Medications: None

## 2018-09-18 NOTE — ED PROVIDER NOTES
HPI Comments: 63 y.o. female with past medical history significant for recurrent depression, seizures, and brain aneurysm who presents from home via EMS after being found on floor with altered mental status. Per EMS, police called them because they found the pt lying on her left side. Per EMS, down time is unknown. Per EMS, pt appears to have a left head injury. Per EMS, pt is only oriented to person and place, but disoriented to time. Per EMS, vital signs were stable en route, but mouth was very dry. In the ED, pt denies any pain. There are no other acute medical concerns at this time. Old chart review: Pt was seen by her PCP on 9/5/18 for her Medicare Annual Wellness Exam. 
 
Social hx - Tobacco use: current smoker, Alcohol Use: none PCP: Laura Brandt MD 
 
Note written by Tha Edmond, as dictated by Su Coleman MD 4:51 PM. The history is provided by the EMS personnel. The history is limited by the condition of the patient. No  was used. Past Medical History:  
Diagnosis Date  Anxiety disorder  Brain aneurysm 2/12/2010  
 Headache(784.0) 2/12/2010  Hyperlipidemia 2/12/2010  Insomnia 2/12/2010  LFT'S ABNORMAL 2/12/2010  Neurological disorder Brain anuruysm  Screen for colon cancer 11/13/2014  Seizures ([facility] [address] Past Surgical History:  
Procedure Laterality Date  HX CHOLECYSTECTOMY  HX COLONOSCOPY  2015  
 due 22  
 HX CRANIOTOMY And cerebral shunt, by Dr. Cathleen Cameron. Followed by Dr. Iban Brantley.  HX HERNIA REPAIR    
 HX PARTIAL HYSTERECTOMY  HX ROTATOR CUFF REPAIR Left 09/2018 Family History:  
Problem Relation Age of Onset  Diabetes Mother  Cancer Father  Cancer Maternal Grandmother  Heart Disease Maternal Grandfather Social History Social History  Marital status:    Spouse name: N/A  
 Number of children: N/A  
 Years of education: N/A  
 
 Occupational History  Not on file. Social History Main Topics  Smoking status: Current Every Day Smoker Packs/day: 0.50 Years: 30.00 Types: Cigarettes  Smokeless tobacco: Never Used Comment: a few cigarettes daily  Alcohol use No  
 Drug use: No  
 Sexual activity: No  
 
Other Topics Concern  Not on file Social History Narrative ALLERGIES: Aspirin; Ibuprofen; Hydrocodone; Percocet [oxycodone-acetaminophen]; and Prednisone Review of Systems Unable to perform ROS: Intubated There were no vitals filed for this visit. Physical Exam  
Constitutional:  
C Collar in place. HENT:  
Head: Normocephalic. Abrasion/laceration to the left periorbital region. Eyes: Pupils are equal, round, and reactive to light. Neck: No tracheal deviation present. Cardiovascular: Normal rate, regular rhythm, normal heart sounds and intact distal pulses. Pulmonary/Chest: Effort normal and breath sounds normal. She has no wheezes. She has no rales. Abdominal: Soft. Neurological: She is alert. Moving all extremities equally. Skin: She is not diaphoretic. Psychiatric:  
Slight slurring of speech. Nursing note and vitals reviewed. Note written by Tha Ashley, as dictated by Adam Morataya MD 4:51 PM. MDM Number of Diagnoses or Management Options Altered mental status, unspecified altered mental status type:  
  
Amount and/or Complexity of Data Reviewed Clinical lab tests: ordered and reviewed Tests in the radiology section of CPT®: ordered and reviewed Decide to obtain previous medical records or to obtain history from someone other than the patient: yes Obtain history from someone other than the patient: yes (EMS, family) Review and summarize past medical records: yes Discuss the patient with other providers: yes (hospitalist) Independent visualization of images, tracings, or specimens: yes Patient Progress Patient progress: stable ED Course Procedures ED EKG interpretation: 
Rhythm: normal sinus rhythm; and regular . Rate (approx.): 82; Axis: normal; P wave: normal; QRS interval: normal ; ST/T wave: normal;  
EKG documented by Sarah Mccarty MD 
 
 
PROGRESS NOTE: 
6:15 PM 
Spoke with the pt's parents. Parents reported the pt had rotator cuff surgery a few weeks ago and on occasion takes more pain medications than she should. Also, the parents states the pt has a shunt in her brain. PROGRESS NOTE: 
6:24 PM 
Labs reviewed. CONSULT NOTE: 
7:32 PM Sarah Mccarty MD spoke with Dr. Patricia Thrasher, Consult for Hospitalist.  Discussed available diagnostic tests and clinical findings. Dr. Patricia Thrasher will see and admit the pt. 
 
7:32 PM 
Patient is being admitted to the hospital.  The results of their tests and reasons for their admission have been discussed with them and/or available family. They convey agreement and understanding for the need to be admitted and for their admission diagnosis. Consultation will be made now with the inpatient physician for hospitalization.

## 2018-09-18 NOTE — ED TRIAGE NOTES
Pt to ED from home after home alarm went off. Police called for EMS who found her lying on left side. Pt stated she had fallen but unknown when. ORiented to person and place, disoriented to time. Pt has no complaints on arrival. C Spine in place. Pt incontinent of bowel and bladder during unknown down time. Lac next to left eyebrow.

## 2018-09-19 NOTE — PROGRESS NOTES
Bedside and Verbal shift change report given to Rohan Villa RN (oncoming nurse) by Henri Whitten RN (offgoing nurse). Report included the following information SBAR, Kardex, ED Summary, MAR, Recent Results and Cardiac Rhythm NSR.

## 2018-09-19 NOTE — PROGRESS NOTES
Problem: Falls - Risk of 
Goal: *Absence of Falls Document [facility] Fall Risk and appropriate interventions in the flowsheet. Outcome: Progressing Towards Goal 
Fall Risk Interventions: 
Mobility Interventions: Patient to call before getting OOB Mentation Interventions: Bed/chair exit alarm, Door open when patient unattended Elimination Interventions: Bed/chair exit alarm, Call light in reach History of Falls Interventions: Bed/chair exit alarm, Door open when patient unattended

## 2018-09-19 NOTE — PROGRESS NOTES
The documentation for this period is being entered following the guidelines as defined in the [address] downtime policy by Leoncio Anderson.

## 2018-09-19 NOTE — ROUTINE PROCESS
TRANSFER - OUT REPORT: 
 
Verbal report given to Jay(name) on Argenis Anderson  being transferred to [address] [place] [address](unit) for routine progression of care Report consisted of patients Situation, Background, Assessment and  
Recommendations(SBAR). Information from the following report(s) SBAR was reviewed with the receiving nurse. Lines:  
Peripheral IV 09/18/18 Left Antecubital (Active) Site Assessment Clean, dry, & intact 9/18/2018  5:15 PM  
Phlebitis Assessment 0 9/18/2018  5:15 PM  
Infiltration Assessment 0 9/18/2018  5:15 PM  
Dressing Status Clean, dry, & intact 9/18/2018  5:15 PM  
Dressing Type Transparent 9/18/2018  5:15 PM  
Hub Color/Line Status Pink;Capped;Flushed;Patent 9/18/2018  5:15 PM  
Action Taken Blood drawn 9/18/2018  5:15 PM  
  
 
Opportunity for questions and clarification was provided. Patient transported with: 
Transport

## 2018-09-19 NOTE — WOUND CARE
Wound Care Note:  
 
New consult placed by nurse request for back of head trauma, above the eye trauma Chart shows: 
Admitted for altered mental status Past Medical History:  
Diagnosis Date  Anxiety disorder  Brain aneurysm 2/12/2010  
 Headache(784.0) 2/12/2010  Hyperlipidemia 2/12/2010  Insomnia 2/12/2010  LFT'S ABNORMAL 2/12/2010  Neurological disorder Brain anuruysm  Screen for colon cancer 11/13/2014  Seizures ([facility] [address] WBC = 9.5 on 9/19//18 Admitted from home Assessment:  
Patient is alert and talking, continent with no assistance needed in repositioning. Bed: Total Care Bariatric with air Patient wearing briefs for incontinence Diet: Regular Patient reports no pain Bilateral heels and buttocks skin intact and without erythema. 1. POA left lateral eye laceration measures 2 cm x 0.1 cm x 0.1 cm, no drainage, mansi-wound intact with some redness, wound bed crusted over and wound bed is not visible. Costilla applied. 2.  POA left hip ecchymosis measures 5 cm x 3 cm, area appears more like ecchymosis and not pressure but we will continue to monitor it. Left open to air. 3.  POA abrasion to left anterior lower leg approximately 3 cm x 1.5 cm, crusted over, no drainage, mansi-wound intact. Left open to air. 4.  POA blanchable erythema to sacrum, sacrum is very prominent and fluctuant, appears edematous, no open area, high risk for breakdown. Left open to air. 5.  No wound noted on back of head but patient has a lot of hair that is curly. Areas that were tender were checked but no wound found. Spoke with Dr. Stevie Soriano, wound care orders obtained. Patient repositioned supine. Nurse to offload heels. Recommendations:   
Left lateral eye- Every 3 days and as needed apply Costilla. Make sure Costilla is dry before patient lays on the left side. Left trochanter- leave open to air. Monitor for darkening of area. Total Care Bariatric bed with air- as long as it does not become a falls risk as bed is higher. If patient needs another bed a specialty bed should be ordered due to prominent sacrum. Skin Care & Pressure Prevention: 
Minimize layers of linen/pads under patient to optimize support surface. Turn/reposition approximately every 2 hours and offload heels. Promote continence Discussed above plan with patient & Godfrey Mariee RN Transition of Care: Plan to follow as needed while admitted to hospital. 
 
JAYDEN VasquezN, RN, [facility], [facility]. 
office [phone] 
pager [phone] or call  to page

## 2018-09-19 NOTE — PROGRESS NOTES
Hospitalist Progress Note Erin Guerin MD 
Answering service: [phone] OR [phone] from in house phone Date of Service:  2018 NAME:  Thea Faye :  1960 MRN:  [id number] Admission Summary:  
The patient is a 55-year-old female with a past medical history of depression, seizures, aneurysm of the brain s/p surgery, history of anxiety disorder, chronic headache, hyperlipidemia, insomnia who presents to the hospital with the altered mental status. The patient is a poor  historian. History is primarily from the ER physician as well as ER note. The patient was evidently was found today on the floor by police. A neighbor had called the police because they have not seen the patient since the last 24 hours or so. The police broke open the door and found the patient lying on her left side, confused, disoriented and altered. EMS was called. The patient's downtime was unknown. The patient appeared to have some left-sided head injury and was found to be oriented to person and place but disoriented to time. The patient was brought to the ER. The patient seems to be somewhat disoriented and workup for altered mental status was done no definite pathology was found but the patient was requested to be observed under the hospitalist service. Interval history / Subjective:  
  Patient seen and examined; states she is doing fine, no headache, dizziness or lightheadedness. Feels weak/tired. Still does not remember what happened. Denies any suicidal or homicidal ideations at this time Had one episode of watery diarrhea overnight. Assessment & Plan: #. Altered mental status; resolving 
-Unclear cause, DDx possible seizure vs intentional/unintentional drug overdose. -CTA head and neck preliminary report with no acute abnormalities. - Resume home meds and continue to monitor #. Seizure d/o:  
-Resume home medication/Keppra -Seizure precautions #. Depression: Denies SI/HI, but patient very emotional 
-Resume home meds and psych consult for further evaluation. #. HLD: will start low dose statin Code status: Full DVT prophylaxis: Heparin sq Care Plan discussed with: Patient/Family and Nurse Disposition: TBD Hospital Problems  Date Reviewed: 9/18/2018 Codes Class Noted POA * (Principal)Altered mental status ICD-10-CM: R41.82 
ICD-9-CM: 780.97  9/18/2018 Unknown Review of Systems: A comprehensive review of systems was negative except for that written in the HPI. Vital Signs:  
 Last 24hrs VS reviewed since prior progress note. Most recent are: 
Visit Vitals  BP (!) 155/91 (BP 1 Location: Right arm, BP Patient Position: At rest)  Pulse 89  Temp 97.7 °F (36.5 °C)  Resp 15  Ht 5' 10\" (1.778 m)  Wt 75.7 kg (166 lb 14.2 oz)  SpO2 96%  BMI 23.95 kg/m2 No intake or output data in the 24 hours ending 09/19/18 0754 Physical Examination:  
 
 
     
Constitutional:  No acute distress, cooperative, pleasant   
ENT:  Oral mucous moist, oropharynx benign. laceration over the left brow Resp:  CTA bilaterally. No wheezing/rhonchi/rales. No accessory muscle use CV:  Regular rhythm, normal rate, no murmurs, gallops, rubs GI:  Soft, non distended, non tender. normoactive bowel sounds, no hepatosplenomegaly Musculoskeletal:  No edema, warm, 2+ pulses throughout Neurologic:  Moves all extremities. AAOx3, CN II-XII reviewed Psych: very emotional/libile mood. SKIN:  Multiple bruising in both bilateral lower extremities that is superficial 
  
 
Data Review:  
 Review and/or order of clinical lab test 
Review and/or order of tests in the radiology section of CPT Labs:  
 
Recent Labs  
   09/19/18 
 0357 09/18/18 
 1704 WBC  9.5  10.2 HGB  14.0  13.7 HCT  43.9  42.7 PLT  319  335 Recent Labs  
   09/19/18 
 0357  09/18/18 
 1704 NA  138  138  
K  3.4*  3.8 CL  104  103 CO2  23  24 BUN  10  13 CREA  0.58  0.63 GLU  125*  102* CA  8.5  8.8 MG  1.8  1.9 PHOS  2.5*   --   
 
Recent Labs  
   09/19/18 0357 09/18/18 1704 SGOT  32  32 ALT  25  28 AP  261*  276* TBILI  0.5  0.4 TP  8.0  8.4* ALB  3.7  4.0  
GLOB  4.3*  4.4* Recent Labs  
   09/18/18 1704 INR  1.0 PTP  9.9 APTT  27.6 No results for input(s): FE, TIBC, PSAT, FERR in the last 72 hours. Lab Results Component Value Date/Time Folate 15.8 09/19/2018 04:04 AM  
  
No results for input(s): PH, PCO2, PO2 in the last 72 hours. Recent Labs  
   09/19/18 0357 09/18/18 1704 CPK  356*  332*  330* CKNDX   --   2.3  
TROIQ  <0.05  <0.05  <0.05 Lab Results Component Value Date/Time Cholesterol, total 240 (H) 09/18/2018 05:04 PM  
 HDL Cholesterol 92 09/18/2018 05:04 PM  
 LDL, calculated 119.2 (H) 09/18/2018 05:04 PM  
 Triglyceride 144 09/18/2018 05:04 PM  
 CHOL/HDL Ratio 2.6 09/18/2018 05:04 PM  
 
Lab Results Component Value Date/Time Glucose (POC) 122 (H) 10/04/2012 12:18 PM  
 Glucose (POC) 88 10/04/2012 12:49 AM  
 Glucose (POC) 95 10/03/2012 10:26 PM  
 Glucose (POC) 96 10/03/2012 08:35 PM  
 Glucose (POC) 120 (H) 10/03/2012 07:33 PM  
 
Lab Results Component Value Date/Time  Color DARK YELLOW 09/18/2018 05:12 PM  
 Appearance CLEAR 09/18/2018 05:12 PM  
 Specific gravity 1.027 09/18/2018 05:12 PM  
 pH (UA) 5.0 09/18/2018 05:12 PM  
 Protein NEGATIVE  09/18/2018 05:12 PM  
 Glucose NEGATIVE  09/18/2018 05:12 PM  
 Ketone TRACE (A) 09/18/2018 05:12 PM  
 Bilirubin Negative 09/05/2018 11:32 AM  
 Urobilinogen 0.2 09/18/2018 05:12 PM  
 Nitrites NEGATIVE  09/18/2018 05:12 PM  
 Leukocyte Esterase NEGATIVE  09/18/2018 05:12 PM  
 Epithelial cells MODERATE (A) 09/18/2018 05:12 PM  
 Bacteria NEGATIVE  09/18/2018 05:12 PM  
 WBC 0-4 09/18/2018 05:12 PM  
 RBC 0-5 09/18/2018 05:12 PM  
 
 
 
 Medications Reviewed:  
 
Current Facility-Administered Medications Medication Dose Route Frequency  potassium chloride SR (KLOR-CON 10) tablet 40 mEq  40 mEq Oral NOW  magnesium oxide (MAG-OX) tablet 400 mg  400 mg Oral BID  sodium chloride (NS) flush 5-10 mL  5-10 mL IntraVENous Q8H  
 sodium chloride (NS) flush 5-10 mL  5-10 mL IntraVENous PRN  
 0.9% sodium chloride infusion  100 mL/hr IntraVENous CONTINUOUS  
 heparin (porcine) injection 5,000 Units  5,000 Units SubCUTAneous Q8H  
 
______________________________________________________________________ EXPECTED LENGTH OF STAY: - - - 
ACTUAL LENGTH OF STAY:          0 Danny Murrieta MD

## 2018-09-19 NOTE — PROGRESS NOTES
Reason for Admission   Altered mental status. RRAT Score:      9 Plan for utilizing home health:      TBD Likelihood of Readmission:  low Transition of Care Plan:        CM met with pt to introduce her to the role of CM and transition of care. She verbalized understanding. This pt lives alone and was independent prior to admission. She stated that she is active and still drives. CM inquired as to what happened for this pt to have the police to break down her door. She stated that she is going through a divorce which has been stressful to her. She did not take her meds for 3 days. She described herself for three days as not taking her meds, not sleeping and going through family pictures during that time. She said that she was in a \"funk\". She said that she feels fine now. CM will follow. [facility] Care Management Interventions PCP Verified by CM: Yes 
Palliative Care Criteria Met (RRAT>21 & CHF Dx)?: No 
Transition of Care Consult (CM Consult): Discharge Planning MyChart Signup: No 
Discharge Durable Medical Equipment: No 
Physical Therapy Consult: No 
Occupational Therapy Consult: No 
Speech Therapy Consult: No 
Current Support Network: Lives Alone Confirm Follow Up Transport: Family Plan discussed with Pt/Family/Caregiver: Yes Freedom of Choice Offered: Yes [employer] Information Provided?: No

## 2018-09-19 NOTE — H&P
[facility] HISTORY AND PHYSICAL Jordi Robledo MR#: [id number] : 1960 ACCOUNT #: [de-identified] ADMIT DATE: 2018 CHIEF COMPLAINT:   Altered mental status. HISTORY OF PRESENT ILLNESS:  The patient is a 64-year-old female with a past medical history of depression, seizures, aneurysm of the brain, history of anxiety disorder, chronic headache, hyperlipidemia, insomnia, abnormal LFTs, and seizures who presents to the hospital with the above mentioned symptoms. The patient is a poor  historian. History is primarily from the ER physician as well as ER note. The patient was evidently was found today on the floor by police. A neighbor had called the police because they have not seen the patient since the last 24 hours or so. The police broke open the door and found the patient lying on her left side, confused, disoriented and altered. EMS was called. The patient's downtime was unknown. The patient appeared to have some left-sided head injury and was found to be oriented to person and place but disoriented to time. The patient was brought to the ER. The patient seems to be somewhat disoriented and workup for altered mental status was done no definite pathology was found but the patient was requested to be observed under the hospitalist service. I spoke with the patient. The patient is somewhat confused, disoriented, keeps repeating that \"I've to talk to my mom because she has diabetes and she needs to take her medication. \"  The patient is also confabulating a lot but providing history and going on tangential conversations. The patient reports that she was \"working on her scrapbook yesterday and putting pictures of her aunts and uncles and not sure what happened after that. \"  Per ER physician, the patient's father last talked to her yesterday.  They also reported that the patient had rotator cuff surgery a few weeks ago and on occasion takes more pain medication than she should. They also report that the patient has a shunt in her brain. The patient currently denies any pain or discomfort anywhere in her body. Denies any headache, blurry vision, sore throat, trouble swallowing, trouble with speech, any chest pain, shortness of breath, cough, fevers, chills, abdominal pain, constipation or diarrhea, urinary symptoms, focal or generalized neurological weakness, recent travel, sick contacts, any hematemesis, melena, hemoptysis or any other concerns or problems. PAST MEDICAL HISTORY:  See above. HOME MEDICATIONS:   Currently the patient is on meloxicam, Phenergan, iron 1000 mg, Imodium, acetaminophen, Seroquel 400 mg q. night, potassium 99 mg q. daily, cholecalciferol, Keppra 100 mg q. b.i.d., Wellbutrin 75 mg q. b.i.d., fluoxetine 80 mg q. daily, Fioricet and clonazepam 1 mg q. b.i.d. as needed. SOCIAL HISTORY:  Everyday smoker. Smokes half a pack per day for 30 years. No alcohol. No IV drug abuse. ALLERGIES:  ASPIRIN, IBUPROFEN AND PREDNISONE. FAMILY HISTORY:  Mother with history of diabetes. Father with history of  (3:40), Maternal grandfather has a history of heart disease. REVIEW OF SYSTEMS:  All systems were reviewed and found to be essentially negative except for the symptoms as mentioned. PHYSICAL EXAMINATION:   
VITAL SIGNS:  Temperature is 98.5, pulse 88, respiratory rate 20, blood pressure 145/79, pulse ox 98% on room air. GENERAL:  Alert x2, awake, disoriented female who appears to be stated age. HEENT: Pupils equal and reactive to light. Dry mucous membrane. The patient has a laceration over the left brow. No active bleeding. Tympanic membranes are clear. NECK:  Supple. No meningeal signs. CHEST:  Clear to auscultation bilaterally. HEART:  S1, S2 heard. ABDOMEN:  Soft, nontender and nondistended. Bowel sounds are physiologic. EXTREMITIES:  No clubbing, no cyanosis, no edema. NEUROPSYCHIATRIC:  Pleasant mood and affect. Cranial nerves II-XII grossly intact. Sensory grossly within normal limits. DTRs 2+. Strength 5/5. SKIN:  Multiple bruising in both bilateral lower extremities that is superficial. No bony injury is elicited. LABORATORY DATA:  White count 10.2, hemoglobin 13.7, hematocrit 42.7, platelets 659. Urine shows moderate amount of epithelial cells, 0-4 WBC, negative bacteria, INR 1. Sodium 138, potassium 3.8, chloride 103, bicarb 24, anion gap 11, glucose 102, BUN 13, creatinine 0.63, calcium 8.8, magnesium 1.9, bili total 0.4. ALT 28, AST 32, alkaline phosphatase 276, , CK-MB 7.5, troponin less than 0.05, ammonia 16. Urine drug tox screen is positive for amphetamines, barbiturates and benzodiazepine. CT of the head shows no evidence of acute intracranial abnormalities, stable positioning of the left central approach ventricular shunt catheter with stable right ventricles. CT of the maxillofacial bones show no evidence of acute fracture, mild  (5:37) zygoma. CT of the spine shows no evidence of acute fracture. ASSESSMENT AND PLAN:  
1. Altered mental status, unclear etiology. Differential includes intentional/unintentional drug overdose. The patient denies any suicidal or any homicidal ideation. Other differential includes seizures, stroke versus other etiology. The patient will be observed on the telemetry bed with gentle IV hydration. Neurovascular checks. We will get a CTA of the head and neck to rule out any  (634) bleeding. We will hold all opioid medications for now. We may consider getting an MRI in the morning. We will provide B12,  (6:48) folate, fall precautions, telemetry monitoring, troponin, CK levels and  6:54 obtained and further management until  (6:58) levels. If symptoms persist in the morning may consider getting an MRI, interventions and diagnostics including further imaging.  Continue to closely monitor. Reassess as needed. Further intervention will be per hospital course. 2.  History of depression. Currently the patient denies any suicidal or homicidal ideation and continue to monitor. 3. History of seizures. I am not sure the medications the patient is taking because of the meds could not be verified. I will hold all medications for now and put patient on seizure precautions. May consider providing Ativan  if she is having seizures or seizure like activity while she is in the hospital.  Neuro checks have been ordered. We will closely monitor the patient. 4. Gastrointestinal and deep venous thrombosis prophylaxis. The patient will be on SCDs. Moira Boyle MD MM/INEZ 
D: 09/18/2018 21:36    
T: 09/18/2018 22:31 
JOB #: [id number]

## 2018-09-20 NOTE — PROGRESS NOTES
[facility] follow-up appointment on Thursday September 27,2018 @ 3:25 p.m. with Dr. Nain Forrest. Added to AVS. Mignon Sawyer CM Specialist

## 2018-09-20 NOTE — PROGRESS NOTES
Physical Therapy Note 9/20/18 Orders received, chart reviewed and patient evaluated by physical therapy. Recommend patient to discharge to home with assistance pending progress with skilled acute care physical therapy. Recommend OT consult for medication management/safety. Recommend with nursing patient to complete as able in order to maintain strength, endurance and independence: OOB to chair 3x/day with SUP and ambulating with SUP. Thank you for your assistance. Full evaluation to follow.   
 
Darya Gaines, PT, DPT

## 2018-09-20 NOTE — DISCHARGE INSTRUCTIONS
Discharge Instructions       PATIENT ID: Kiley Cherry  MRN: [id number]   YOB: 1960    DATE OF ADMISSION: 9/18/2018  4:50 PM    DATE OF DISCHARGE: 9/20/2018    PRIMARY CARE PROVIDER: Dany Penaloza MD     ATTENDING PHYSICIAN: Julissa Tyson MD  DISCHARGING PROVIDER: Julissa Tyson MD    To contact this individual call [phone] and ask the  to page. If unavailable ask to be transferred the Adult Hospitalist Department. DISCHARGE DIAGNOSES alter mental status     CONSULTATIONS: IP CONSULT TO HOSPITALIST  IP CONSULT TO PSYCHIATRY    PROCEDURES/SURGERIES: * No surgery found *    PENDING TEST RESULTS:   At the time of discharge the following test results are still pending: none     FOLLOW UP APPOINTMENTS:   Follow-up Information     Follow up With Details Comments [address] [place] [address] Tan Pena MD   [address] [place] [address]  [phone]             ADDITIONAL CARE RECOMMENDATIONS:   1. Resume routine activities  2. Follow up with PCP in 1 week   3. Fall precaution     DIET: Regular Diet    ACTIVITY: Activity as tolerated          DISCHARGE MEDICATIONS:   See Medication Reconciliation Form    · It is important that you take the medication exactly as they are prescribed. · Keep your medication in the bottles provided by the pharmacist and keep a list of the medication names, dosages, and times to be taken in your wallet. · Do not take other medications without consulting your doctor. NOTIFY YOUR PHYSICIAN FOR ANY OF THE FOLLOWING:   Fever over 101 degrees for 24 hours. Chest pain, shortness of breath, fever, chills, nausea, vomiting, diarrhea, change in mentation, falling, weakness, bleeding. Severe pain or pain not relieved by medications. Or, any other signs or symptoms that you may have questions about.       DISPOSITION:    Home With:   OT  PT  HH  RN       SNF/Inpatient Rehab/LTAC    Independent/assisted living    Hospice   x Other:     CDMP Checked:   Yes x     PROBLEM LIST Updated:  Yes x       Signed:   Pito Newton MD  9/20/2018  10:05 AM

## 2018-09-20 NOTE — PROGRESS NOTES
Problem: Mobility Impaired (Adult and Pediatric) Goal: *Acute Goals and Plan of Care (Insert Text) Physical Therapy Goals Initiated 9/20/2018 1. Patient will move from supine to sit and sit to supine  and scoot up and down in bed with independence within 7 day(s). 2.  Patient will transfer from bed to chair and chair to bed with independence using the least restrictive device within 7 day(s). 3.  Patient will perform sit to stand with independence within 7 day(s). 4.  Patient will ambulate with independence for 200 feet with the least restrictive device within 7 day(s). physical Therapy EVALUATION Patient: Isauro Moses (22 y.o. female) Date: 9/20/2018 Primary Diagnosis: Altered mental status Precautions:     
 
ASSESSMENT : 
Based on the objective data described below, the patient presents at/near her baseline functional mobility level with minor balance, insight, and safety awareness deficits noted following admission for AMS and GLF. All imaging negative for acute process, UA was + for polysubstances; hx of cerebral aneurysm with clipping and shunt. Prior to this admission, pt reports that she lived at home alone (going through a divorce) and was indep with BADLs and mobility without use of AD. Ex- present towards 2nd half of session and indicates pt with hx of frequent falls and also reports that pt is s/p recent L \"rotator cuff surgery\". Pt indicates essentially noncompliant with post op restrictions and use of sling. L UE movement and use limited during session today. Pt was cleared for mobility by RN, received in bed and motivated to participate in session. Impulsive and tangential throughout, question a small degree of confabulation as well? She was able to complete bed mobility and transfers with SUP w/o overt difficulty or LOB.  Gait training initiated without AD - note minor instabilities and path drifts initially that improved with duration. Emphasis on integration of dual task completion, DGI components, and multi level reaching (R UE) - no overt LOB or difficulty observed. Anticipate from a functional mobility standpoint she will be appropriate for discharge home with family assist as needed - question safety from cognitive standpoint. Recommend OT consult for cognition and medication management/safety assessment. Will follow for additional 1-2 visits while admitted to ensure consistency with stability. Patient will benefit from skilled intervention to address the above impairments. Patients rehabilitation potential is considered to be Good Factors which may influence rehabilitation potential include:  
[]         None noted 
[x]         Mental ability/status []         Medical condition 
[]         Home/family situation and support systems 
[x]         Safety awareness 
[]         Pain tolerance/management 
[]         Other: PLAN : 
Recommendations and Planned Interventions: 
[x]           Bed Mobility Training             [x]    Neuromuscular Re-Education 
[x]           Transfer Training                   []    Orthotic/Prosthetic Training 
[x]           Gait Training                         []    Modalities [x]           Therapeutic Exercises           []    Edema Management/Control 
[x]           Therapeutic Activities            [x]    Patient and Family Training/Education 
[]           Other (comment): Frequency/Duration: Patient will be followed by physical therapy  2 times a week to address goals. Discharge Recommendations: Outpatient vs None Further Equipment Recommendations for Discharge: None SUBJECTIVE:  
Patient stated I'm ready to get out of here.  OBJECTIVE DATA SUMMARY:  
HISTORY:   
Past Medical History:  
Diagnosis Date  Anxiety disorder  Brain aneurysm 2/12/2010  
 Headache(784.0) 2/12/2010  Hyperlipidemia 2/12/2010  Insomnia 2/12/2010  LFT'S ABNORMAL 2/12/2010  Neurological disorder Brain anuruysm  Screen for colon cancer 11/13/2014  Seizures ([facility] [address] Past Surgical History:  
Procedure Laterality Date  HX CHOLECYSTECTOMY  HX COLONOSCOPY  2015  
 due 23  
 HX CRANIOTOMY And cerebral shunt, by Dr. Vijay Hinojosa. Followed by Dr. Estrellita Peters.  HX HERNIA REPAIR    
 HX PARTIAL HYSTERECTOMY  HX ROTATOR CUFF REPAIR Left 09/2018 Prior Level of Function/Home Situation: lives at home alone; indep with ADLs and mobility w/o AD Personal factors and/or comorbidities impacting plan of care: PMHx Home Situation Home Environment: Private residence # Steps to Enter: 0 One/Two Story Residence: Two story, live on 1st floor Living Alone: Yes Support Systems: Friends \ neighbors, Family member(s) Patient Expects to be Discharged to[de-identified] Private residence Current DME Used/Available at Home: None EXAMINATION/PRESENTATION/DECISION MAKING:  
Critical Behavior: 
Neurologic State: Alert Orientation Level: Oriented to person, Oriented to place, Oriented to situation Cognition: Follows commands, Impulsive Safety/Judgement: Awareness of environment, Decreased insight into deficits, Decreased awareness of need for safety, Decreased awareness of need for assistance Hearing: Auditory Auditory Impairment: None Skin:  Intact where exposed Edema: none noted Range Of Motion: 
AROM: Within functional limits Strength:   
Strength: Within functional limits Tone & Sensation:  
Tone: Normal 
Sensation: Intact Coordination: 
Coordination: Within functional limits Vision:  
Tracking: Able to track stimulus in all quadrants w/o difficulty Diplopia: No 
Functional Mobility: 
Bed Mobility: 
Supine to Sit: Supervision Transfers: 
Sit to Stand: Supervision Stand to Sit: Supervision Balance:  
Sitting: Intact Standing: Impaired; Without support Standing - Static: Good Standing - Dynamic : Good Ambulation/Gait Training: 
Distance (ft): 75 Feet (ft) Assistive Device: Gait belt Ambulation - Level of Assistance: Contact guard assistance;Supervision Gait Description (WDL): Exceptions to [facility] Gait Abnormalities: Path deviations Base of Support: Narrowed Speed/Evelyn: Fluctuations Functional Measure: 
Rich Balance Test: 
 
Sitting to Standin Standing Unsupported: 4 Sitting with Back Unsupported: 4 Standing to Sittin Transfers: 4 Standing Unsupported with Eyes Closed: 3 Standing Unsupported with Feet Together: 3 Reach Forward with Outstretched Arm: 4  Object: 4 Turn to Look Over Shoulders: 4 Turn 360 Degrees: 4 Alternate Foot on Step/Stool: 3 Standing Unsupported One Foot in Front: 3 Stand on One Le Total: 50 
  
 
 
56=Maximum possible score;  
0-20=High fall risk 21-40=Moderate fall risk 41-56=Low fall risk Marrero Balance Test and G-code impairment scale: 
Percentage of Impairment CH 
 
0% 
 CI 
 
1-19% CJ 
 
20-39% CK 
 
40-59% CL 
 
60-79% CM 
 
80-99% CN  
 
100% Rich Score 0-56 56 45-55 34-44 23-33 12-22 1-11 0  
 
 
G codes: In compliance with CMSs Claims Based Outcome Reporting, the following G-code set was chosen for this patient based on their primary functional limitation being treated: The outcome measure chosen to determine the severity of the functional limitation was the Rich with a score of 50/56 which was correlated with the impairment scale. ? Mobility - Walking and Moving Around:  
  - CURRENT STATUS: CI - 1%-19% impaired, limited or restricted  - GOAL STATUS: CH - 0% impaired, limited or restricted  - D/C STATUS:  ---------------To be determined--------------- Physical Therapy Evaluation Charge Determination History Examination Presentation Decision-Making HIGH Complexity :3+ comorbidities / personal factors will impact the outcome/ POC  MEDIUM Complexity : 3 Standardized tests and measures addressing body structure, function, activity limitation and / or participation in recreation  LOW Complexity : Stable, uncomplicated  Other outcome measures nguyen  LOW Based on the above components, the patient evaluation is determined to be of the following complexity level: LOW Pain: 
Pain Scale 1: Numeric (0 - 10) Pain Intensity 1: 0 Activity Tolerance:  
NAD Please refer to the flowsheet for vital signs taken during this treatment. After treatment:  
[x]         Patient left in no apparent distress sitting up in chair 
[]         Patient left in no apparent distress in bed 
[x]         Call bell left within reach [x]         Nursing notified 
[x]         Caregiver present [x]         Chair alarm activated COMMUNICATION/EDUCATION:  
The patients plan of care was discussed with: Occupational Therapist and Registered Nurse. [x]         Fall prevention education was provided and the patient/caregiver indicated understanding. [x]         Patient/family have participated as able in goal setting and plan of care. [x]         Patient/family agree to work toward stated goals and plan of care. []         Patient understands intent and goals of therapy, but is neutral about his/her participation. []         Patient is unable to participate in goal setting and plan of care. Thank you for this referral. 
Margaret Ormond, PT , DPT Time Calculation: 40 mins

## 2018-09-20 NOTE — PROGRESS NOTES
Occupational Therapy EVALUATION/discharge Patient: Aneudy Carbajal (17 y.o. female) Date: 9/20/2018 Primary Diagnosis: Altered mental status Precautions:    
 
ASSESSMENT:  
Based on the objective data described below, the patient presents with overall supervision-IND for bed mobility, functional mobility, and ADLs s/p admission for AMS. Patient has extensive neuro and psych history. Today, patient appears to be focusing close to baseline. Patient's strength, ROM and FM/GM coordination are all WFL. Patient noted to be mildly impulsive and presented with tangential speech. Patient completed medication management task with only 1 error while engaging in conversation with therapist and with distraction from significant other entering room. Anticipate patient will have no further acute OT needs and no f/u OT services once medically cleared for D/C. Will sign off at this time. Further skilled acute occupational therapy is not indicated at this time. Discharge Recommendations: None Further Equipment Recommendations for Discharge: none SUBJECTIVE:  
Patient stated I think my cat got into my medication, I need to hide it from him.  OBJECTIVE DATA SUMMARY:  
HISTORY:  
Past Medical History:  
Diagnosis Date  Anxiety disorder  Brain aneurysm 2/12/2010  
 Headache(784.0) 2/12/2010  Hyperlipidemia 2/12/2010  Insomnia 2/12/2010  LFT'S ABNORMAL 2/12/2010  Neurological disorder Brain anuruysm  Screen for colon cancer 11/13/2014  Seizures ([facility] [address] Past Surgical History:  
Procedure Laterality Date  HX CHOLECYSTECTOMY  HX COLONOSCOPY  2015  
 due 22  
 HX CRANIOTOMY And cerebral shunt, by Dr. Bennett Right. Followed by Dr. Sara Domingo.  HX HERNIA REPAIR    
 HX PARTIAL HYSTERECTOMY  HX ROTATOR CUFF REPAIR Left 09/2018 Prior Level of Function/Environment/Context: Patient lives in a 2 level home, lives on ground floor (reports only going upstairs to complete laundry). Patient lives alone and was IND with self care, IADLs and functional mobility. Patient has family who can assist PRN. Home Situation Home Environment: Private residence # Steps to Enter: 0 One/Two Story Residence: Two story, live on 1st floor Living Alone: Yes Support Systems: Family member(s), Friends \ neighbors Patient Expects to be Discharged to[de-identified] Private residence Current DME Used/Available at Home: Grab bars Tub or Shower Type: Shower Hand dominance: Right EXAMINATION OF PERFORMANCE DEFICITS: 
Cognitive/Behavioral Status: 
Neurologic State: Alert Orientation Level: Oriented X4 Cognition: Follows commands; Impulsive Perception: Appears intact Perseveration: No perseveration noted Safety/Judgement: Awareness of environment;Decreased awareness of need for assistance;Decreased insight into deficits Skin: Appears grossly intact Edema: none noted in BUEs Hearing: Auditory Auditory Impairment: None Vision/Perceptual:   
Tracking: Able to track stimulus in all quadrants w/o difficulty Diplopia: No   
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  
Strength: Within functional limits Coordination: 
Coordination: Within functional limits Fine Motor Skills-Upper: Left Intact; Right Intact Gross Motor Skills-Upper: Left Intact; Right Intact Tone & Sensation: In [facility] Tone: Normal 
Sensation: Intact Balance: 
Sitting: Intact Standing: Impaired; Without support Standing - Static: Good Standing - Dynamic : Good Functional Mobility and Transfers for ADLs: 
Bed Mobility: 
Supine to Sit: Supervision Scooting: Independent (to [facility]) Transfers: 
Sit to Stand: Supervision Stand to Sit: Supervision Toilet Transfer : Supervision (Infer per PT and RN report) ADL Assessment: 
Feeding: Independent* 
 
 Oral Facial Hygiene/Grooming: Independent* Bathing: Supervision* Upper Body Dressing: Independent* Lower Body Dressing: Independent Toileting: Supervision* 
 
*Infer per obs of functional mobility, functional reach, BUE ROM, strength, balance, cognition and PT/RN report ADL Intervention and task modifications: 
Patient completed medication management task with only 1 error. Patient was given a list of medications, multiple medication bottles (more than needed) and a pill box. Patient demonstrated ability to accurately fill pill box with appropriate number of pills in the correct dosages and frequency. Patient noted to leave out 1 dose of medication (2 doses daily) for 4/7 days. Educated patient on importance of checking and re-checking medication box for accuracy and completing this task in an environment with minimal distractions (patient having conversation with therapist and then with significant other who entered room half way through task). Patient verbalized understanding. Lower Body Dressing Assistance Socks: Independent Leg Crossed Method Used: No 
Position Performed: Supine Cues: Doff; Don 
 
Cognitive Retraining Safety/Judgement: Awareness of environment;Decreased awareness of need for assistance;Decreased insight into deficits Functional Measure: 
Barthel Index: 
 
Bathin Bladder: 10 Bowels: 10 
Groomin Dressing: 10 Feeding: 10 Mobility: 10 Stairs: 0 Toilet Use: 10 Transfer (Bed to Chair and Back): 10 Total: 80 Barthel and G-code impairment scale: 
Percentage of impairment CH 
0% CI 
1-19% CJ 
20-39% CK 
40-59% CL 
60-79% CM 
80-99% CN 
100% Barthel Score 0-100 100 99-80 79-60 59-40 20-39 1-19 
 0 Barthel Score 0-20 20 17-19 13-16 9-12 5-8 1-4 0 The Barthel ADL Index: Guidelines 1. The index should be used as a record of what a patient does, not as a record of what a patient could do. 2. The main aim is to establish degree of independence from any help, physical or verbal, however minor and for whatever reason. 3. The need for supervision renders the patient not independent. 4. A patient's performance should be established using the best available evidence. Asking the patient, friends/relatives and nurses are the usual sources, but direct observation and common sense are also important. However direct testing is not needed. 5. Usually the patient's performance over the preceding 24-48 hours is important, but occasionally longer periods will be relevant. 6. Middle categories imply that the patient supplies over 50 per cent of the effort. 7. Use of aids to be independent is allowed. Anais Winkler., Barthel, D.W. (1575). Functional evaluation: the Barthel Index. [address] [facility] (14)2. SAMANTHA Lewis Ace, Selina Almaraz., Aurora Tim., [place], [address] [place] [address] (1999). Measuring the change indisability after inpatient rehabilitation; comparison of the responsiveness of the Barthel Index and Functional Burke Measure. Journal of Neurology, Neurosurgery, and Psychiatry, 66(4), 831-431. Will Cote, N.J.A, EMILY Finney, & Damian Rendon M.A. (2004.) Assessment of post-stroke quality of life in cost-effectiveness studies: The usefulness of the Barthel Index and the EuroQoL-5D. [facility], 13, 140-21 G codes: In compliance with CMSs Claims Based Outcome Reporting, the following G-code set was chosen for this patient based on their primary functional limitation being treated: The outcome measure chosen to determine the severity of the functional limitation was the Barthel Index with a score of 80/100 which was correlated with the impairment scale. ? Self Care:  
  - CURRENT STATUS: CI - 1%-19% impaired, limited or restricted  - GOAL STATUS: CI - 1%-19% impaired, limited or restricted  - D/C STATUS:  CI - 1%-19% impaired, limited or restricted Occupational Therapy Evaluation Charge Determination History Examination Decision-Making LOW Complexity : Brief history review  LOW Complexity : 1-3 performance deficits relating to physical, cognitive , or psychosocial skils that result in activity limitations and / or participation restrictions  LOW Complexity : No comorbidities that affect functional and no verbal or physical assistance needed to complete eval tasks Based on the above components, the patient evaluation is determined to be of the following complexity level: LOW Pain: 
Pain Scale 1: Numeric (0 - 10) Pain Intensity 1: 0 Activity Tolerance:  
Good, VSS Please refer to the flowsheet for vital signs taken during this treatment. After treatment:  
[]  Patient left in no apparent distress sitting up in chair 
[x]  Patient left in no apparent distress in bed 
[x]  Call bell left within reach [x]  Nursing notified 
[x]  Caregiver present 
[]  Bed alarm activated COMMUNICATION/EDUCATION:  
Communication/Collaboration: 
[x]      Home safety education was provided and the patient/caregiver indicated understanding. [x]      Patient/family have participated as able and agree with findings and recommendations. []      Patient is unable to participate in plan of care at this time. Findings and recommendations were discussed with: Physical Therapist and Registered Nurse Nuris Whatley OT Time Calculation: 26 mins

## 2018-09-20 NOTE — PROGRESS NOTES
Problem: Falls - Risk of 
Goal: *Absence of Falls Document [facility] Fall Risk and appropriate interventions in the flowsheet. Outcome: Progressing Towards Goal 
Fall Risk Interventions: 
Mobility Interventions: Bed/chair exit alarm, Patient to call before getting OOB, PT Consult for mobility concerns, PT Consult for assist device competence, Strengthening exercises (ROM-active/passive) Mentation Interventions: Bed/chair exit alarm, Adequate sleep, hydration, pain control, Evaluate medications/consider consulting pharmacy, Increase mobility, More frequent rounding, Reorient patient, Room close to nurse's station, Toileting rounds Elimination Interventions: Bed/chair exit alarm, Call light in reach, Patient to call for help with toileting needs, Toileting schedule/hourly rounds History of Falls Interventions: Bed/chair exit alarm, Evaluate medications/consider consulting pharmacy, Room close to nurse's station

## 2018-09-20 NOTE — PROGRESS NOTES
Problem: Falls - Risk of 
Goal: *Absence of Falls Document [facility] Fall Risk and appropriate interventions in the flowsheet. Outcome: Progressing Towards Goal 
Fall Risk Interventions: 
Mobility Interventions: Bed/chair exit alarm, Patient to call before getting OOB, PT Consult for mobility concerns, PT Consult for assist device competence, Strengthening exercises (ROM-active/passive) Mentation Interventions: Bed/chair exit alarm, Adequate sleep, hydration, pain control, Evaluate medications/consider consulting pharmacy, Increase mobility, More frequent rounding, Reorient patient, Room close to nurse's station, Toileting rounds Elimination Interventions: Bed/chair exit alarm, Call light in reach, Patient to call for help with toileting needs, Toileting schedule/hourly rounds History of Falls Interventions: Bed/chair exit alarm, Evaluate medications/consider consulting pharmacy, Room close to nurse's station Problem: Pressure Injury - Risk of 
Goal: *Prevention of pressure injury Document Benjamín Scale and appropriate interventions in the flowsheet. Offload heels Turn approximately every 2 hours Outcome: Progressing Towards Goal 
Pressure Injury Interventions: 
  
 
Moisture Interventions: Apply protective barrier, creams and emollients, Check for incontinence Q2 hours and as needed, Maintain skin hydration (lotion/cream), Minimize layers, Offer toileting Q_hr, Moisture barrier Activity Interventions: Increase time out of bed, Pressure redistribution bed/mattress(bed type), PT/OT evaluation Mobility Interventions: HOB 30 degrees or less, Pressure redistribution bed/mattress (bed type), PT/OT evaluation Nutrition Interventions: Document food/fluid/supplement intake, Discuss nutritional consult with provider, Offer support with meals,snacks and hydration

## 2018-09-20 NOTE — PROGRESS NOTES
Problem: Falls - Risk of 
Goal: *Absence of Falls Document [facility] Fall Risk and appropriate interventions in the flowsheet. Outcome: Progressing Towards Goal 
Fall Risk Interventions: 
Mobility Interventions: Bed/chair exit alarm, Patient to call before getting OOB, PT Consult for mobility concerns, PT Consult for assist device competence, Strengthening exercises (ROM-active/passive) Mentation Interventions: Bed/chair exit alarm, Adequate sleep, hydration, pain control, Evaluate medications/consider consulting pharmacy, Increase mobility, More frequent rounding, Reorient patient, Room close to nurse's station, Toileting rounds Elimination Interventions: Bed/chair exit alarm, Call light in reach, Patient to call for help with toileting needs, Toileting schedule/hourly rounds History of Falls Interventions: Bed/chair exit alarm, Evaluate medications/consider consulting pharmacy, Room close to nurse's station

## 2018-09-27 NOTE — MR AVS SNAPSHOT
[address] 
[phone] Patient: Emily Esteban MRN: [id number] TEA:[id number]/64/[id number] Visit Information Date & Time Provider Department Dept. Phone Encounter #  
 9/27/2018  3:25 PM Jeevan Byrne, [address] [facility] Internal Medicine [phone] [id number] Upcoming Health Maintenance Date Due Shingrix Vaccine Age 50> (1 of 2) 2/14/2011 BREAST CANCER SCRN MAMMOGRAM 10/11/2018 MEDICARE YEARLY EXAM 9/6/2019 PAP AKA CERVICAL CYTOLOGY 3/7/2020 COLONOSCOPY 11/14/2021 DTaP/Tdap/Td series (2 - Td) 9/5/2028 Allergies as of 9/27/2018  Review Complete On: 9/27/2018 By: Mayra Nicolas LPN Severity Noted Reaction Type Reactions Aspirin High 07/22/2010    Other (comments) Has had gastric bypass, and three brain surgeries. Ibuprofen High 07/22/2010    Other (comments) Gastric bypass surgeon advised not to take . Prednisone  09/01/2017    Other (comments) Equilibrium off Current Immunizations  Reviewed on 9/7/2018 Name Date Influenza Nasal Vaccine 10/3/2015 Influenza Vaccine 8/25/2017, 9/9/2016, 9/30/2015, 10/31/2013 Influenza Vaccine (Quad) PF 9/5/2018 Pneumococcal Conjugate (PCV-13) 11/8/2016 Pneumococcal Polysaccharide (PPSV-23) 10/31/2015 Tdap 9/5/2018 Not reviewed this visit You Were Diagnosed With   
  
 Codes Comments Seizure disorder ([facility]    -  Primary ICD-10-CM: M15.129 ICD-9-CM: 345.90 Elevated liver enzymes     ICD-10-CM: R74.8 ICD-9-CM: 790.5 Vitals BP Pulse Temp Resp Height(growth percentile) Weight(growth percentile) 120/76 79 98.3 °F (36.8 °C) (Oral) 16 5' 10\" (1.778 m) 159 lb (72.1 kg) SpO2 BMI OB Status Smoking Status 97% 22.81 kg/m2 Hysterectomy Current Every Day Smoker Vitals History BMI and BSA Data  Body Mass Index Body Surface Area  
 22.81 kg/m 2 1.89 m 2  
  
  
 Preferred Pharmacy Pharmacy Name Phone [address] [facility], [address] [facility] [address] Wesley Cotton Your Updated Medication List  
  
   
This list is accurate as of 9/27/18  4:28 PM.  Always use your most recent med list.  
  
  
  
  
 acetaminophen 500 mg tablet Commonly known as:  TYLENOL Take  by mouth every six (6) hours as needed for Pain. buPROPion 75 mg tablet Commonly known as:  [facility] Take  by mouth two (2) times a day. butalbital-acetaminophen-caffeine -40 mg per tablet Commonly known as:  FIORICET, ESGIC  
TAKE 1 TABLET BY MOUTH EVERY 4 HOURS AS NEEDED FOR PAIN  
  
 cholecalciferol (vitamin D3) 2,000 unit Tab Commonly known as:  VITAMIN D3 Take 1 Tab by mouth daily. clonazePAM 1 mg tablet Commonly known as:  Tawana Broaden Take 1 mg by mouth two (2) times daily as needed (Anxiety). FLUoxetine 40 mg capsule Commonly known as:  PROzac Take 80 mg by mouth daily. levETIRAcetam 1,000 mg tablet Take 1 Tab by mouth two (2) times a day. loperamide 2 mg tablet Commonly known as:  IMMODIUM Take 2 mg by mouth four (4) times daily as needed for Diarrhea.  
  
 meloxicam 15 mg tablet Commonly known as:  MOBIC  
TAKE 1 TABLET BY MOUTH  DAILY  
  
 multivitamin tablet Commonly known as:  ONE A DAY Take 1 Tab by mouth daily. PERFECT IRON PO Take 1,000 mg by mouth.  
  
 potassium 99 mg tablet Take 99 mg by mouth daily. promethazine 25 mg tablet Commonly known as:  PHENERGAN Take 1 tablet by mouth  every 8 hours as needed for nausea QUEtiapine 400 mg tablet Commonly known as:  SEROquel Take 400 mg by mouth nightly. Introducing [facility]! Mathew London introduces [employer] patient portal. Now you can access parts of your medical record, email your doctor's office, and request medication refills online.    
 
1. In your internet browser, go to [email or web address] [email or web address] 2. Click on the First Time User? Click Here link in the Sign In box. You will see the New Member Sign Up page. 3. Enter your Entellium Access Code exactly as it appears below. You will not need to use this code after youve completed the sign-up process. If you do not sign up before the expiration date, you must request a new code. · Entellium Access Code: [email or web address] Expires: 12/4/2018 11:02 AM 
 
4. Enter the last four digits of your Social Security Number (xxxx) and Date of Birth (mm/dd/yyyy) as indicated and click Submit. You will be taken to the next sign-up page. 5. Create a [employer] ID. This will be your Entellium login ID and cannot be changed, so think of one that is secure and easy to remember. 6. Create a Entellium password. You can change your password at any time. 7. Enter your Password Reset Question and Answer. This can be used at a later time if you forget your password. 8. Enter your e-mail address. You will receive e-mail notification when new information is available in [address] 9. Click Sign Up. You can now view and download portions of your medical record. 10. Click the Download Summary menu link to download a portable copy of your medical information. If you have questions, please visit the Frequently Asked Questions section of the Entellium website. Remember, Entellium is NOT to be used for urgent needs. For medical emergencies, dial 911. Now available from your iPhone and Android! Please provide this summary of care documentation to your next provider. Your primary care clinician is listed as STACEY LOUIS. If you have any questions after today's visit, please call [phone].

## 2018-09-27 NOTE — PROGRESS NOTES
HISTORY OF PRESENT ILLNESS  Aroldo Solorio is a 63 y.o. female. HPI Gagan Stack is seen today for hospital follow up for an episode she describes as a seizure. She was admitted to [facility] 9/18/18 and discharged on 9/20/18. There was no nurse navigator contact documented so this does not represent a transitional care visit. She had an episode of seizure unwitnessed. A neighbor found her after not seeing her for 24 hours. Gagan Stack admits to not taking her medication or eating for about three days. She did fall and sustain a laceration to her left brow. She has had no seizures since and states she will be compliant with her medication regimen from here on. She has not scheduled a consultation with her neurologist which I strongly advised. I reviewed the hospital record and cannot find any documentation of driving restriction. I cannot tell if the DMV was contacted regarding this episode. I reviewed with Gagan Stack that my understanding of the law was that she should not drive six months after a seizure. She is understandably very upset about this as she now lives alone and does not have much in the way of any assistance. She keeps telling me she is \"straight now. \" She vows she will take her medications as directed. I have left it with her that I will research with our nurse navigators whether there was any contact with DMV and I likely will need to file a report with them. Past Medical History:   Diagnosis Date    Anxiety disorder     Brain aneurysm 2/12/2010    Headache(784.0) 2/12/2010    Hyperlipidemia 2/12/2010    Insomnia 2/12/2010    LFT'S ABNORMAL 2/12/2010    Neurological disorder     Brain anuruysm    Screen for colon cancer 11/13/2014    Seizures (HCC)      Current Outpatient Prescriptions   Medication Sig    levETIRAcetam 1,000 mg tablet Take 1 Tab by mouth two (2) times a day.     meloxicam (MOBIC) 15 mg tablet TAKE 1 TABLET BY MOUTH  DAILY    promethazine (PHENERGAN) 25 mg tablet Take 1 tablet by mouth  every 8 hours as needed for nausea    IRON,CARBONYL (PERFECT IRON PO) Take 1,000 mg by mouth.  loperamide (IMMODIUM) 2 mg tablet Take 2 mg by mouth four (4) times daily as needed for Diarrhea.  acetaminophen (TYLENOL) 500 mg tablet Take  by mouth every six (6) hours as needed for Pain.  QUEtiapine (SEROQUEL) 400 mg tablet Take 400 mg by mouth nightly.  potassium 99 mg tablet Take 99 mg by mouth daily.  cholecalciferol, vitamin D3, (VITAMIN D3) 2,000 unit tab Take 1 Tab by mouth daily.  buPROPion (WELLBUTRIN) 75 mg tablet Take  by mouth two (2) times a day.  multivitamin (ONE A DAY) tablet Take 1 Tab by mouth daily.  FLUoxetine (PROZAC) 40 mg capsule Take 80 mg by mouth daily.  butalbital-acetaminophen-caffeine (FIORICET, ESGIC) -40 mg per tablet TAKE 1 TABLET BY MOUTH EVERY 4 HOURS AS NEEDED FOR PAIN    clonazepam (KLONOPIN) 1 mg tablet Take 1 mg by mouth two (2) times daily as needed (Anxiety). No current facility-administered medications for this visit. Review of Systems   Constitutional: Negative for chills and fever. Neurological: Positive for seizures. Negative for focal weakness. Physical Exam   Constitutional: She appears well-nourished. Neck: Carotid bruit is not present. Cardiovascular: Normal rate and regular rhythm. Exam reveals no gallop and no friction rub. No murmur heard. Pulmonary/Chest: Effort normal and breath sounds normal. No respiratory distress. Musculoskeletal: She exhibits no edema. Nursing note and vitals reviewed. ASSESSMENT and PLAN  Diagnoses and all orders for this visit:    1. Seizure disorder ([facility]- see hpi, Proceed with plan as discussed , See neurologist as directed     2.  Elevated liver enzymes- See gastroenterologist as directed

## 2018-10-01 NOTE — TELEPHONE ENCOUNTER
Called DMV and they were not authorized to provide me with this information. Called [facility] Hospitalist team and Dr Vandana Kohli is not back in until 10/4/18. Sent her a tiger text.

## 2018-10-03 NOTE — TELEPHONE ENCOUNTER
Reviewed my findings of review of web site for driving regulations post breakthrough seizure. Needs to stop driving for 3 months effective immediately. I've sent the forms to [facility]. Understandably she's unhappy. I advised a follow up in 3 months for check up and likely drug levels.

## 2018-10-04 NOTE — TELEPHONE ENCOUNTER
Advised pt MD said as he discussed with her in detail at her appt - its not his decision but a state law that is very clearly stated. He recommends she call the DMV.

## 2018-10-04 NOTE — TELEPHONE ENCOUNTER
Candy Ferraro (Self) [phone] (H)     Pt called to \"appeal to Dr. Jace Edwards" regarding driving restriction. Stated she is going thru a bad divorce, her father is on chemo, & her mother is in end-stage renal failure so she \"has no one. \"  States she has to be able to get her medications from either a PO box or [facility] so has to be able to drive to get them or she would have \"another breakdown. \"   Pt referenced her \"seizure episode\" as a \"breakdown\" several times throughout the call and repeatedly said \"we've put her in a catch-22 situation. \"  Pt would like to discuss this.

## 2018-10-11 NOTE — TELEPHONE ENCOUNTER
Spoke with pt - states she wanted to know if MD would revisit his decision about her drivers licence? Advised her I thought this was discussed with MD at last office visit. She states it was but she did not have a seizure - she forgot to take her medication for several days is the reason for \"all this. \" Will forward to MD.    She received a call from the liver doctors office about fuv - wanted to know his name - Dr Naye Alejandro. She wanted to know if she should go? Advised her Dr Iker Sosa wants her to follow up with Dr Naye Alejandro.

## 2018-10-12 NOTE — TELEPHONE ENCOUNTER
Advised pt MD said there is not really anything he can \"revisit\" as it is a state law. He is really sorry this has to be done but its the right thing to do. Also she absolutely should see Dr José Boone. She states she received her DMV forms and will fax to MD to complete.  Will forward to MD.

## 2018-10-18 NOTE — TELEPHONE ENCOUNTER
Left detailed message MD received her folder with DMV forms. She has filled out his part on form. He has requested she send him a blank form for him to fill out.

## 2018-10-22 NOTE — TELEPHONE ENCOUNTER
Patient asked if Abraham Horne would give her a call back concerning whatever needs to be done to get her 's license back. She is starting the process early since it takes a while -- she needs to be able to help her parents who are both sick.

## 2018-10-22 NOTE — TELEPHONE ENCOUNTER
Advised pt MD out of office today. We have her DMV blank forms (in MD's folder) for MD to complete. Once completed she would like a call.  Will forward to MD.

## 2018-10-30 NOTE — LETTER
11/4/2018 2:35 PM 
 
Ms. Radha Tidwell [address] [facility] [address] Dear Radha Tidwell: 
 
Please find your most recent results below. Resulted Orders LEVETIRACETAM (KEPPRA) Result Value Ref Range LEVETIRACETAM, S 32.0 10.0 - 40.0 ug/mL Narrative Performed at:  [address]  [id number] : Suresh Post MD, Phone:  [phone] RECOMMENDATIONS: 
None. Keep up the good work! Continue with current  medications. Level is within normal range. Please call me if you have any questions: [phone] Sincerely, Jorge A Montiel MD

## 2018-10-30 NOTE — PROGRESS NOTES
HISTORY OF PRESENT ILLNESS Sen Dave is a 63 y.o. female. ROLA Leola Delgado is seen today for follow up of seizure disorder and other concerns. 1. Seizure disorder. She is now compliant with medications. Will check level of Keppra to assure stability. I did advise her to follow up with her neurologist as I have done previously. 2. Anxiety with depression. She is up to date with psychiatry and psychology follow up. Social history notable for her ceasing driving as I have advised. Formal paper work has gone into [facility]. Also, of note, her father is going into hospice and she is actually staying at her parent's home to help out. Lastly, having reviewed her lab workup including drug screening during her hospitalization in September, the urine specimen was positive for amphetamine. She takes no medications of this class either by prescription or for recreation. She denies taking any amphetamine type medications. I will attribute this to a false positive and will follow up on a prn basis only. Review of Systems Neurological: Negative for seizures. Psychiatric/Behavioral: Negative for depression. The patient is nervous/anxious. Physical Exam  
Neurological: She is alert. Skin: Skin is warm and dry. Psychiatric: She has a normal mood and affect. Her behavior is normal.  
Nursing note and vitals reviewed. ASSESSMENT and PLAN Diagnoses and all orders for this visit: 1. Seizure disorder ([address] -     LEVETIRACETAM (KEPPRA), See neurologist as directed 2. Recurrent depression [facility])- See psychiatrist as directed , See psychologist as discussed/directed

## 2018-10-30 NOTE — PROGRESS NOTES
Identified pt with two pt identifiers(name and ). Reviewed record in preparation for visit and have obtained necessary documentation. Chief Complaint Patient presents with  
[facility] Follow Up  
   medications Health Maintenance Due Topic  Shingrix Vaccine Age 50> (1 of 2)  BREAST CANCER SCRN MAMMOGRAM   
 
 
Coordination of Care Questionnaire: 
:  
1) Have you been to an emergency room, urgent care, or hospitalized since your last visit?   no If yes, where when, and reason for visit? 2. Have seen or consulted any other health care provider since your last visit? YES If yes, where when, and reason for visit? 3) Do you have an Advanced Directive/ Living Will in place? NO If yes, do we have a copy on file NO If no, would you like information NO Patient is accompanied by self I have received verbal consent from Chemayi to discuss any/all medical information while they are present in the room. Visit Vitals /74 (BP 1 Location: Right arm, BP Patient Position: Sitting) Pulse 88 Temp 97.7 °F (36.5 °C) (Oral) Resp 16 Ht 5' 10\" (1.778 m) Wt 164 lb (74.4 kg) SpO2 98% BMI 23.53 kg/m²

## 2018-11-21 NOTE — TELEPHONE ENCOUNTER
Nancy Iniguez ([facility]) [phone] (H)     Pt is requesting a call back regarding a letter she received from the [facility].

## 2018-11-21 NOTE — TELEPHONE ENCOUNTER
Spoke to patient who reports she is \"not happy and very dissapointed\" in the letter she received from [facility]. She states the conversation that she had with Dr. Netta Cohen was completely opposite of what the letter says. Advised she dropped off a hand written note for Dr. Netta Cohen this morning along with the letter from [facility]. Wants to know where to go from here.

## 2018-11-26 NOTE — TELEPHONE ENCOUNTER
Please call to discuss med's and DMV letter . Her father is dying and she needs to be there and would like to discuss the letter to [facility] .  Please fax all forms to [facility] today

## 2018-11-27 NOTE — TELEPHONE ENCOUNTER
Would like to speak to a nurse re:  calling another  on her behalf? Please call patient with additional questions.  [phone]

## 2018-11-30 NOTE — TELEPHONE ENCOUNTER
Pt wants to know status of the forms to be filled out - have the been faxed over to Dr Celestina Man for her appt.   Advise - [phone]

## 2018-11-30 NOTE — TELEPHONE ENCOUNTER
Spoke with pt - states she has appt with Dr Zen Carpenter on 12/4/18. Wanted MD to send letter to him in regards to her not driving just for 3 months. States her episode was back in September and 3 months are almost up. Needs her license to care for her father who is dying. Reviewed again why she cannot drive and that Dr Brett Mccollum must follow federal guidelines when a person has a seizure. She states she did not have a seizure. Advised her MD has advised nurses to end discussion on this issue. He has done all he can do in regards to this matter.  Will forward to MD.

## 2018-11-30 NOTE — TELEPHONE ENCOUNTER
Advised pt MD said to let her know 2 things:   - he will send letter to Dr Agapito Lebron. - will call dmv next week to see how it works regarding the timing of license suspension as it is well documented when I told her to stop driving but I'm not sure how they handle that. She wanted to let MD know how grateful she is to him. Would like to keep him as her pcp. She apologizes for her behavior recently - she is under a lot of stress.  Will forward to MD.

## 2018-12-04 NOTE — TELEPHONE ENCOUNTER
Reviewed case with DMV rep, Ms Ghanshyam Paerdes. Confirmed she's on a 3 month suspension, not 6 and can be re assessed post 12/18 with labs, urine and CMR filed by me.  Will relay this to Crista

## 2018-12-05 NOTE — TELEPHONE ENCOUNTER
Patient requesting a callback from Yessenia Osorio -- says she needs to get her driving privileges back today!

## 2018-12-05 NOTE — TELEPHONE ENCOUNTER
Pt came into office requesting her ER records from September visit. Advised her I can not give them to her - she would have to get them from the hospital. She states her neuro MD said if she got them he could get her licence today. Advised her Dr Joe Macias spoke with [facility] rep Ms Brooks Mensah yesterday - that she'll be eligible for assessment for license reinstatement after 12/18. Will need lab, urine and forms filled out by myself. He suggest rescheduling tomorrow's appt to 12/18 as she'll have to come back anyway. This of course would entail no other acute issues ongoing for tomorrow's appt. She was not happy with this but agreed.

## 2018-12-12 NOTE — TELEPHONE ENCOUNTER
Nancy Iniguez (Self) [phone] (H)     Pt is requesting a call back regarding getting her license back and getting the process started.

## 2018-12-18 NOTE — TELEPHONE ENCOUNTER
Spoke with pt - advised her she will need labs done today. Could she come in earlier (before her appt) to have done since [employer] closes at 5 pm? She states she could come in around or before 4:30 pm today. Her appt is at 5:05 pm today. Lab slip will be at  for her once MD has order labs.  Will forward to MD.

## 2018-12-18 NOTE — PROGRESS NOTES
HISTORY OF PRESENT ILLNESS  Ela Merlos is a 63 y.o. female. ROLA Jacobson is seen today for follow up of seizure disorder. She has had no further seizures. She is doing much better despite a high level of stress. Her father is in hospice. She is very anxious to get her 's license back. As of now, she has completed 3 months of not driving. I will submit the forms after lab tests return. The DMV is requiring repeat drug levels and drug screen. Review of Systems   Neurological: Negative for seizures and loss of consciousness. Physical Exam   Neurological: She is alert. Skin: Skin is warm and dry. Psychiatric: She has a normal mood and affect. Her behavior is normal.   Nursing note and vitals reviewed. ASSESSMENT and PLAN  Diagnoses and all orders for this visit:    1.  Seizure disorder ([facility]    - Proceed with plan as discussed , See neurologist as directed , Continue current regimen of prescription and / or OTC medications

## 2018-12-27 NOTE — TELEPHONE ENCOUNTER
Called [facility] Medical Review Services - spoke with Annalee - advised her MD is requesting to see if they could fast track her license reinstatement due to compassionate reasons? Pt's father is in hospice and her mother is not well. Forms have been faxed to them. He also wants to thank them ever so much for their help with this. Annalee states she would pull her papers today to have processed. Pt can call ([phone]) tomorrow to see when her  License is reinstated or she will receive a letter.

## 2018-12-27 NOTE — TELEPHONE ENCOUNTER
----- Message from Keiko Glez MD sent at 12/27/2018 12:40 PM EST -----  Regarding: acs  Call DMV medical review dept- ask them to fast track her license reinstatement due to compassionate reasons, father in hospice, etc. Forms have been sent.  Thanks to them ever so much for their help

## 2019-01-01 ENCOUNTER — TELEPHONE (OUTPATIENT)
Dept: INTERNAL MEDICINE CLINIC | Age: 59
End: 2019-01-01

## 2019-01-01 ENCOUNTER — OFFICE VISIT (OUTPATIENT)
Dept: NEUROLOGY | Age: 59
End: 2019-01-01

## 2019-01-01 ENCOUNTER — OFFICE VISIT (OUTPATIENT)
Dept: INTERNAL MEDICINE CLINIC | Age: 59
End: 2019-01-01

## 2019-01-01 ENCOUNTER — TELEPHONE (OUTPATIENT)
Dept: NEUROLOGY | Age: 59
End: 2019-01-01

## 2019-01-01 ENCOUNTER — DOCUMENTATION ONLY (OUTPATIENT)
Dept: INTERNAL MEDICINE CLINIC | Age: 59
End: 2019-01-01

## 2019-01-01 VITALS
BODY MASS INDEX: 24.34 KG/M2 | HEART RATE: 84 BPM | TEMPERATURE: 98.3 F | SYSTOLIC BLOOD PRESSURE: 132 MMHG | HEIGHT: 70 IN | WEIGHT: 170 LBS | RESPIRATION RATE: 18 BRPM | DIASTOLIC BLOOD PRESSURE: 88 MMHG | OXYGEN SATURATION: 96 %

## 2019-01-01 VITALS
RESPIRATION RATE: 18 BRPM | OXYGEN SATURATION: 98 % | DIASTOLIC BLOOD PRESSURE: 88 MMHG | WEIGHT: 163.5 LBS | HEART RATE: 83 BPM | BODY MASS INDEX: 23.41 KG/M2 | TEMPERATURE: 97.3 F | SYSTOLIC BLOOD PRESSURE: 130 MMHG | HEIGHT: 70 IN

## 2019-01-01 VITALS
RESPIRATION RATE: 18 BRPM | TEMPERATURE: 97.9 F | HEIGHT: 70 IN | SYSTOLIC BLOOD PRESSURE: 122 MMHG | WEIGHT: 170 LBS | HEART RATE: 70 BPM | DIASTOLIC BLOOD PRESSURE: 84 MMHG | OXYGEN SATURATION: 95 % | BODY MASS INDEX: 24.34 KG/M2

## 2019-01-01 DIAGNOSIS — G40.909 SEIZURE DISORDER (HCC): ICD-10-CM

## 2019-01-01 DIAGNOSIS — J32.9 BACTERIAL SINUSITIS: Primary | ICD-10-CM

## 2019-01-01 DIAGNOSIS — F41.9 ANXIETY DISORDER, UNSPECIFIED TYPE: Primary | ICD-10-CM

## 2019-01-01 DIAGNOSIS — R41.0 CONFUSION: ICD-10-CM

## 2019-01-01 DIAGNOSIS — G31.84 MILD NEUROCOGNITIVE DISORDER: Primary | ICD-10-CM

## 2019-01-01 DIAGNOSIS — F41.1 GENERALIZED ANXIETY DISORDER: ICD-10-CM

## 2019-01-01 DIAGNOSIS — F32.89 OTHER DEPRESSION: ICD-10-CM

## 2019-01-01 DIAGNOSIS — B96.89 BACTERIAL SINUSITIS: Primary | ICD-10-CM

## 2019-01-01 DIAGNOSIS — R41.3 MEMORY LOSS: Primary | ICD-10-CM

## 2019-01-01 DIAGNOSIS — M54.31 BACK PAIN WITH RIGHT-SIDED SCIATICA: Primary | ICD-10-CM

## 2019-01-01 DIAGNOSIS — J32.9 BACTERIAL SINUSITIS: ICD-10-CM

## 2019-01-01 DIAGNOSIS — G40.909 SEIZURE DISORDER (HCC): Primary | ICD-10-CM

## 2019-01-01 DIAGNOSIS — B96.89 BACTERIAL SINUSITIS: ICD-10-CM

## 2019-01-01 DIAGNOSIS — F41.9 ANXIETY: Primary | ICD-10-CM

## 2019-01-01 DIAGNOSIS — F33.9 RECURRENT DEPRESSION (HCC): ICD-10-CM

## 2019-01-01 RX ORDER — BUTALBITAL, ACETAMINOPHEN AND CAFFEINE 50; 325; 40 MG/1; MG/1; MG/1
TABLET ORAL
Refills: 0 | COMMUNITY
Start: 2019-01-01

## 2019-01-01 RX ORDER — METHYLPREDNISOLONE 4 MG/1
TABLET ORAL
Qty: 1 DOSE PACK | Refills: 0 | Status: SHIPPED | OUTPATIENT
Start: 2019-01-01

## 2019-01-01 RX ORDER — MELOXICAM 15 MG/1
TABLET ORAL
Qty: 90 TAB | Refills: 3 | Status: SHIPPED | OUTPATIENT
Start: 2019-01-01

## 2019-01-01 RX ORDER — CEFUROXIME AXETIL 250 MG/1
250 TABLET ORAL 2 TIMES DAILY
Qty: 20 TAB | Refills: 0 | Status: SHIPPED | OUTPATIENT
Start: 2019-01-01 | End: 2019-01-01

## 2019-01-04 NOTE — TELEPHONE ENCOUNTER
Called [facility] Medical Review Services - spoke with Masood to follow up on if pt license was reinstated. She advised me she can not give me that information. She can tell me they have processed her papers and a letter has been mailed to her. Pt will need to tell us if she has it back. Will forward to MD.    Have not heard from pt - thought best not to call.

## 2019-02-08 NOTE — TELEPHONE ENCOUNTER
Pt has received a letter from [facility] that states that the forms submitted by Dr Antonio Whalen was incomplete. Pt would like for  to check records and resend to [facility].   Candace Denver - [phone]

## 2019-02-13 NOTE — TELEPHONE ENCOUNTER
Called [facility] Medical Review Services - spoke with Kristin Xie. Advised her we received a message from pt that she received a letter from [facility] that the forms Dr Donnell Cruz sent were incomplete. Harriett advised me that the forms we sent were complete and the letter sent to pt did not say they were incomplete. She could not tell me what the letter said. I would have to reach out to pt for her to tell me.  Will forward to MD.

## 2019-02-13 NOTE — TELEPHONE ENCOUNTER
Spoke with pt - states she received DMV letter in January - it said something able forms being incomplete. She states she mailed her letter to MD to see - he should get sometime this week. Advised her I spoke with DMV today - I was told forms were completed and were fine. Asked if she was driving? Yes she got her licence back. So I told her not sure why she was calling. She said the letter will explain better than she can. Will forward to MD.    Cliffton Primrose: her father past away 1/2/19 - gave her our condolences. She states she knows he's in a better place.

## 2019-02-15 NOTE — TELEPHONE ENCOUNTER
----- Message from France Laguna MD sent at 2/15/2019  4:13 PM EST -----  Regarding: acs  Call- I've spoken with dmv , she is going to resubmit to the board for review, should let us know by middle of next wk

## 2019-02-15 NOTE — TELEPHONE ENCOUNTER
Advised pt MD has spoken with [facility]  and she is going to resubmit to the board for review. she should let us know by middle of next week. Once she calls MD we will let her now.

## 2019-03-27 NOTE — PATIENT INSTRUCTIONS
Sinusitis: Care Instructions  Your Care Instructions    Sinusitis is an infection of the lining of the sinus cavities in your head. Sinusitis often follows a cold. It causes pain and pressure in your head and face. In most cases, sinusitis gets better on its own in 1 to 2 weeks. But some mild symptoms may last for several weeks. Sometimes antibiotics are needed. Follow-up care is a key part of your treatment and safety. Be sure to make and go to all appointments, and call your doctor if you are having problems. It's also a good idea to know your test results and keep a list of the medicines you take. How can you care for yourself at home? · Take an over-the-counter pain medicine, such as acetaminophen (Tylenol), ibuprofen (Advil, Motrin), or naproxen (Aleve). Read and follow all instructions on the label. · If the doctor prescribed antibiotics, take them as directed. Do not stop taking them just because you feel better. You need to take the full course of antibiotics. · Be careful when taking over-the-counter cold or flu medicines and Tylenol at the same time. Many of these medicines have acetaminophen, which is Tylenol. Read the labels to make sure that you are not taking more than the recommended dose. Too much acetaminophen (Tylenol) can be harmful. · Breathe warm, moist air from a steamy shower, a hot bath, or a sink filled with hot water. Avoid cold, dry air. Using a humidifier in your home may help. Follow the directions for cleaning the machine. · Use saline (saltwater) nasal washes to help keep your nasal passages open and wash out mucus and bacteria. You can buy saline nose drops at a grocery store or drugstore. Or you can make your own at home by adding 1 teaspoon of salt and 1 teaspoon of baking soda to 2 cups of distilled water. If you make your own, fill a bulb syringe with the solution, insert the tip into your nostril, and squeeze gently. Bijal Romeo your nose.   · Put a hot, wet towel or a warm gel pack on your face 3 or 4 times a day for 5 to 10 minutes each time. · Try a decongestant nasal spray like oxymetazoline (Afrin). Do not use it for more than 3 days in a row. Using it for more than 3 days can make your congestion worse. When should you call for help? Call your doctor now or seek immediate medical care if:    · You have new or worse swelling or redness in your face or around your eyes.     · You have a new or higher fever.    Watch closely for changes in your health, and be sure to contact your doctor if:    · You have new or worse facial pain.     · The mucus from your nose becomes thicker (like pus) or has new blood in it.     · You are not getting better as expected. Where can you learn more? Go to http://jorje-mila.info/. Enter U152 in the search box to learn more about \"Sinusitis: Care Instructions. \"  Current as of: March 27, 2018  Content Version: 11.9  © 5410-8103 [employer]. Care instructions adapted under license by [employer] (which disclaims liability or warranty for this information). If you have questions about a medical condition or this instruction, always ask your healthcare professional. [place] [address] any warranty or liability for your use of this information.

## 2019-03-27 NOTE — PROGRESS NOTES
HISTORY OF PRESENT ILLNESS  Reji Aldridge is a 63 y.o. female. Sinus Infection    The history is provided by the patient. This is a new problem. Episode onset: one month. The problem has not changed since onset. Patient reports a subjective fever - was not measured. The pain is mild. The pain has been fluctuating since onset. Associated symptoms include congestion, ear pain, hoarse voice, sinus pressure, sore throat and cough. Pertinent negatives include no chills and no sweats. She has tried decongestants for the symptoms. The treatment provided mild relief. Review of Systems   Constitutional: Negative for chills. HENT: Positive for congestion, ear pain, hoarse voice, sinus pressure and sore throat. Respiratory: Positive for cough. Physical Exam   Constitutional: No distress. HENT:   Right Ear: Tympanic membrane and ear canal normal.   Left Ear: Tympanic membrane and ear canal normal.   Nose: Right sinus exhibits maxillary sinus tenderness and frontal sinus tenderness. Left sinus exhibits maxillary sinus tenderness and frontal sinus tenderness. Mouth/Throat: Posterior oropharyngeal edema and posterior oropharyngeal erythema present. No oropharyngeal exudate. Eyes: Conjunctivae are normal. Right eye exhibits no discharge. Left eye exhibits no discharge. Neck: Neck supple. Cardiovascular: Normal rate and regular rhythm. Exam reveals no gallop and no friction rub. No murmur heard. Pulmonary/Chest: Effort normal and breath sounds normal.   Lymphadenopathy:     She has no cervical adenopathy. Nursing note and vitals reviewed. ASSESSMENT and PLAN  Diagnoses and all orders for this visit:    1. Bacterial sinusitis  -     cefUROXime (CEFTIN) 250 mg tablet; Take 1 Tab by mouth two (2) times a day for 10 days.   - Dayquil severe and Nyquil nighttime  as directed

## 2019-04-10 NOTE — PROGRESS NOTES
HISTORY OF PRESENT ILLNESS  Kristan Hashimoto is a 63 y.o. female. HPI Subjective:  Tanesha Sanchez is seen today to review current back pain, as well as follow up for seizure disorder. 1. Back pain. Currently level of pain is 9/10. It has been present for nine days. She has not had any improvement despite seeing a chiropractor. I am very hesitant about NSAIDs given her multiple problems. She does agree to a trial of a Medrol Dosepak, but will let me know if symptoms persist.  2. Seizure disorder. She requires both disability and DMV forms to be completed. Her neurologist is retiring and we will need to discuss who will follow her condition. 3. Acute sinusitis. This resolved. Current Outpatient Medications   Medication Sig    methylPREDNISolone (MEDROL DOSEPACK) 4 mg tablet Per directions    butalbital-acetaminophen-caffeine (FIORICET, ESGIC) -40 mg per tablet     meloxicam (MOBIC) 15 mg tablet TAKE 1 TABLET BY MOUTH  DAILY    levETIRAcetam 1,000 mg tablet Take 1 Tab by mouth two (2) times a day.  promethazine (PHENERGAN) 25 mg tablet Take 1 tablet by mouth  every 8 hours as needed for nausea    IRON,CARBONYL (PERFECT IRON PO) Take 1,000 mg by mouth.  loperamide (IMMODIUM) 2 mg tablet Take 2 mg by mouth four (4) times daily as needed for Diarrhea.  QUEtiapine (SEROQUEL) 400 mg tablet Take 400 mg by mouth nightly.  potassium 99 mg tablet Take 99 mg by mouth daily.  cholecalciferol, vitamin D3, (VITAMIN D3) 2,000 unit tab Take 1 Tab by mouth daily.  buPROPion (WELLBUTRIN) 75 mg tablet Take  by mouth two (2) times a day.  multivitamin (ONE A DAY) tablet Take 1 Tab by mouth daily.  FLUoxetine (PROZAC) 40 mg capsule Take 80 mg by mouth daily.  clonazepam (KLONOPIN) 1 mg tablet Take 1 mg by mouth two (2) times daily as needed (Anxiety). No current facility-administered medications for this visit. Review of Systems   HENT: Negative for congestion.     Musculoskeletal: Positive for back pain. Neurological: Positive for tingling and sensory change. Physical Exam   Neurological: She is alert. Reflex Scores:       Patellar reflexes are 2+ on the right side and 2+ on the left side. Normal motor and sensory exam, negative straight leg raising test bilateral     Skin: Skin is warm and dry. Psychiatric: She has a normal mood and affect. Her behavior is normal.   Nursing note and vitals reviewed. ASSESSMENT and PLAN  Diagnoses and all orders for this visit:    1. Back pain with right-sided sciatica  -     methylPREDNISolone (MEDROL DOSEPACK) 4 mg tablet; Per directions    2. Seizure disorder ([facility] [address]- Continue current regimen of prescription and / or OTC medications , Proceed with plan as discussed     3.  Bacterial sinusitis- Call with recurrence

## 2019-04-24 NOTE — PATIENT INSTRUCTIONS

## 2019-04-24 NOTE — PROGRESS NOTES
[facility] at [place] [address], [address]    Office:  [phone]  Fax: [phone]                 Initial Office Exam  Patient Name: Oren Hunter  Age: 63 y.o. Gender: female   Handedness: right handed   Presenting Concern: had concerns including Neurologic Problem and Memory Loss. Primary Care Physician: Baltazar Silvestre MD  Referring Provider Baltazar Slivestre MD      REASON FOR REFERRAL:  This comprehensive and medically necessary neuropsychological assessment was requested to assist a differential diagnosis of memory problems and/or dementia. The use and purpose of this examination, as well as the extent and limitations of confidentiality, were explained prior to obtaining permission to participate. Instructions were provided regarding the necessity to put forth optimal effort and answer questions truthfully in order to obtain reliable and accurate test results. PERTINENT HISTORY:  Ms. Ken Jasso presented for a neuropsychological assessment at the recommendation of his treating physician secondary to complaints of memory loss. She has reported symptoms that include memory loss, sleep difficulty, and decreased energy. Ms. Ken Jasso began noticing symptoms following an aneurysm in 2004. From a brief review of her medical and personal history no other significant neurological injury or illness has been noted or reported. She did report experiencing depression and anxiety in the past.  Ms. Ken Jasso does not  exercise on a regular basis and maintains a balanced diet. She reported no problems with sleep and pain complaints. She does  participate in mentally stimulating activities. Ken Jasso has no concerns regarding prescription medications, family members, place of residence, or financial stressors. Ms. Myron Duenas indicated that she is independent in her instrumental activities of daily living, including shopping, meal preparation, housekeeping, doing laundry, driving a car, managing medications, and finances. Current Outpatient Medications   Medication Sig    methylPREDNISolone (MEDROL DOSEPACK) 4 mg tablet Per directions    butalbital-acetaminophen-caffeine (FIORICET, ESGIC) -40 mg per tablet     meloxicam (MOBIC) 15 mg tablet TAKE 1 TABLET BY MOUTH  DAILY    levETIRAcetam 1,000 mg tablet Take 1 Tab by mouth two (2) times a day.  promethazine (PHENERGAN) 25 mg tablet Take 1 tablet by mouth  every 8 hours as needed for nausea    IRON,CARBONYL (PERFECT IRON PO) Take 1,000 mg by mouth.  loperamide (IMMODIUM) 2 mg tablet Take 2 mg by mouth four (4) times daily as needed for Diarrhea.  QUEtiapine (SEROQUEL) 400 mg tablet Take 400 mg by mouth nightly.  potassium 99 mg tablet Take 99 mg by mouth daily.  cholecalciferol, vitamin D3, (VITAMIN D3) 2,000 unit tab Take 1 Tab by mouth daily.  buPROPion (WELLBUTRIN) 75 mg tablet Take  by mouth two (2) times a day.  multivitamin (ONE A DAY) tablet Take 1 Tab by mouth daily.  FLUoxetine (PROZAC) 40 mg capsule Take 80 mg by mouth daily.  clonazepam (KLONOPIN) 1 mg tablet Take 1 mg by mouth two (2) times daily as needed (Anxiety). No current facility-administered medications for this visit. Past Medical History:   Diagnosis Date    Anxiety disorder     Brain aneurysm 2/12/2010    Headache(784.0) 2/12/2010    Hyperlipidemia 2/12/2010    Insomnia 2/12/2010    LFT'S ABNORMAL 2/12/2010    Neurological disorder     Brain anuruysm    Screen for colon cancer 11/13/2014    Seizures ([facility] [address]        No flowsheet data found. No data recorded    Past Surgical History:   Procedure Laterality Date    HX CHOLECYSTECTOMY      HX COLONOSCOPY  2015    due 26    HX CRANIOTOMY      And cerebral shunt, by Dr. John Alva.   Followed by Dr. Fiordaliza Whitfield.  HX HERNIA REPAIR      HX PARTIAL HYSTERECTOMY      HX ROTATOR CUFF REPAIR Left 09/2018       Social History     Socioeconomic History    Marital status:      Spouse name: Not on file    Number of children: Not on file    Years of education: Not on file    Highest education level: Not on file   Tobacco Use    Smoking status: Current Every Day Smoker     Packs/day: 0.50     Years: 30.00     Pack years: 15.00     Types: Cigarettes    Smokeless tobacco: Never Used    Tobacco comment: a few cigarettes daily   Substance and Sexual Activity    Alcohol use: No     Alcohol/week: 0.0 oz    Drug use: No     Types: OTC, Prescription    Sexual activity: Never       Family History   Problem Relation Age of Onset    Diabetes Mother     Cancer Father     Cancer Maternal Grandmother     Heart Disease Maternal Grandfather        CT Results (most recent): MRI Results (most recent):  No results found for this or any previous visit. FUNCTIONAL ABILITY:  Patient does total self care  Fall Risk Assessment, last 12 mths 10/30/2018   Able to walk? Yes   Fall in past 12 months? No       CLINICAL INTERVIEW:  Cognitive:    Emotional:    Social:    Functional:     MENTAL STATUS:    Sensorium  disoriented   Orientation person, place, time/date, situation, day of week, month of year and year   Relations cooperative   Eye Contact appropriate   Appearance:  age appropriate   Motor Behavior:  within normal limits   Speech:  normal pitch and normal volume   Vocabulary average   Thought Process: within normal limits   Thought Content Mildly perseverative   Suicidal ideations none   Homicidal ideations none   Mood:  irritable   Affect:  anxious   Memory recent  adequate   Memory remote:  impaired   Concentration:  adequate   Abstraction:  concrete   Insight:  age appropriate   Reliability fair   Judgment:  fair     On the Modified Mini-Mental Status Exam = 98/100 (WNL)      DIAGNOSTIC IMPRESSIONS:  1. Memory loss  2. R/O Major Neurocognitive Disorder  3. Anxiety Disorder, mild       PLAN:  1. Complete a comprehensive neuropsychological assessment to provide a differential diagnosis of presenting concerns as well as to assist with disposition and treatment planning as appropriate. 2. Consider compensatory and remedial cognitive training. 3. Consider nonpharmacological interventions for anxiety disorder. 4. Consider an adaptive driving evaluation. 5. Consider placement issues to provide greater structure and supervision to ensure safety, health and well-being. 70171 x 1 Review of records. Face to face interview w/ patient. Determine test protocol: 60 minutes. Total 1 unit  96116 x 1 NSE Review of records. Face to face interview w/ patient. Determine test protocol: 60 minutes. Total 1 unit  96121 x 1 added due to extensive record review. Edda Balderas, PhD, ABPP, LCP  Licensed Clinical Psychologist/ Neuropsychologist    This note was created using voice recognition software. Despite editing, there may be syntax errors. This note will not be viewable in [address]

## 2019-04-26 NOTE — TELEPHONE ENCOUNTER
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 She states she has appt with Dr Marck Alonso on Monday 4/29/19 for testing. Advised her to make sure he fax report to Dr Delmer Walker so he can complete her form.  Will forward to MD.

## 2019-04-26 NOTE — TELEPHONE ENCOUNTER
Cynthia Rodriguez (Self) [phone] (H)     Pt calling to find out the status of a DMV form she left her last week for  Deanne Son to fill out.

## 2019-04-30 NOTE — TELEPHONE ENCOUNTER
Spoke with pt to check on if she had her appt with Dr Molly Bhatia yesterday? She had told me last week she was scheduled to have neuropsych testing done. She states she did. Was there most of the day. Advised her did not see where she had an appt. She states Dr Molly Bhatia was having computer issues yesterday but she was there. I will call Dr Guera Steel office in am for report to be faxed if not in [facility] by tomorrow. Will forward to MD.    Also advised her she should come by our office tomorrow to complete her part of the dmv form before we fax it.

## 2019-04-30 NOTE — TELEPHONE ENCOUNTER
----- Message from Atha Oppenheim, MD sent at 4/30/2019  4:09 PM EDT -----  Regarding: acs  Call-  1- Form can't go without her neuropsych testing, why the delay ?   2-She should come by here tomorrow to complete her part of the dmv form before we fax it

## 2019-05-01 NOTE — TELEPHONE ENCOUNTER
Advised pt MD wants her to schedule vignesh ttoday to discuss DMV forms.  Scheduled today at 1:40 pm.

## 2019-05-01 NOTE — PROGRESS NOTES
[address] [place] [address] [facility] [place] [address], [address]    Office:  [phone]  Fax: [phone]               Neuropsychological Evaluation Report  Patient Name: Carlos Page  Age: 63 y.o. Gender: female   Handedness: right-handed   Presenting Concern: had no chief complaint listed for this encounter. PATIENT HISTORY (OBTAINED DURING INITIAL CLINICAL EVALUATION):     Primary Care Physician: Zaki Bello MD  Referring Provider Zaki Bello MD        REASON FOR REFERRAL:  This comprehensive and medically necessary neuropsychological assessment was requested to assist a differential diagnosis of memory problems and/or dementia. The use and purpose of this examination, as well as the extent and limitations of confidentiality, were explained prior to obtaining permission to participate. Instructions were provided regarding the necessity to put forth optimal effort and answer questions truthfully in order to obtain reliable and accurate test results.     PERTINENT HISTORY:  Ms. Myron Duenas presented for a neuropsychological assessment at the recommendation of his treating physician secondary to complaints of memory loss. She has reported symptoms that include memory loss, sleep difficulty, and decreased energy. Ms. Myron Duenas began noticing symptoms following an aneurysm in 2004. From a brief review of her medical and personal history no other significant neurological injury or illness has been noted or reported. She did report experiencing depression and anxiety in the past.  Ms. Myorn Duenas does not  exercise on a regular basis and maintains a balanced diet. She reported no problems with sleep and pain complaints. She does  participate in mentally stimulating activities. Ms. Myron Duenas has no concerns regarding prescription medications, family members, place of residence, or financial stressors.   Ms. Myron Duenas indicated that she is independent in her instrumental activities of daily living, including shopping, meal preparation, housekeeping, doing laundry, driving a car, managing medications, and finances.     METHODS OF ASSESSMENT (Current Evaluation):  Clinician Administered:  Clinical Interview  Luz Marina Salm Anxiety Scale (VAISHNAVI)  Metz Depression Scale-II (BDI-II)  Clock Drawing Task  Hospital Anxiety and Depression Scales  Incidental Memory Test  Modified Mini-Mental Status Exam (3MS)  Test of Premorbid Functioning      Technician Administered:    Neuropsychological Assessment Battery (NAB): Attention and Memory Modules+  Selected subtests of the NAB: Writing, Naming,Design Construction, Visual Discrimination, and Categories  Personality Assessment Inventory  Test of Memory Malingering  Trailmaking Test A & B    TEST OBSERVATIONS:  Ms. Kelly Ramirez arrived promptly for the testing session. Dress and grooming were appropriate; physical presentation was unchanged from that observed during the clinical interview. Speech was fluent, intelligible, and goal-directed. Affect was congruent with the euthymic mood conveyed. Ms. Kelly Ramirez was adequately cooperative and appeared to put forth good effort throughout this examination. Rapport with the examiner was adequately established and maintained. Minimal prompting was required. Comprehension of test instructions was not problematic. Performance motivation was objectively measured by one instrument TOM, and Ms. Kelly Ramirez produced a normal score on this measure. Accordingly, test findings below do not appear to be the product of disingenuous effort. Given the above observations, plus comments contained in the Mental Status section, the results of this examination are regarded as reasonably reliable and valid. TEST RESULTS:  Quantitative test results are derived from comparisons to age and education corrected cohort normative data, where applicable.   Percentiles are included in these instances. Qualitative test results are determined using clinical observations.                                        Classification:  Attention/Concentration:       Simple visuomotor tracking (<.01 percentile)     Severely Impaired  Digits forward (24 percentile)       Low Average  Digits backward (12 percentile)       Low  Average  Visual scanning (27 percentile)                  Average  Simple information processing speed (<1 percentile)    Severely Impaired   Complex information processing speed (4 percentile)    Moderately Below Expected Level  Attention to visual detail of driving scenes (12 percentile)    Low  Average    Visuospatial and Constructional Praxis:     Figure copy (<1 percentile)                            Severely Impaired  Visual discrimination (42 percentile)                Average   Design construction (54 percentile)     Average    Language:         Picture naming (62 percentile)                           Average  Writing (73 percentile)                 Average     Memory and Learning:       Word list immediate recall (5 percentile)                Mildly Below Expected Level  Word list short delayed recall (7 percentile)    Mildly Below Expected Level  Word list long delayed recall (18 percentile)               Low Average  Shape learning immediate recognition (12 percentile)   Low Average    Shape learning delayed recognition (34 percentile)              Average  Story learning immediate recall (66 percentile)    Average  Story learning delayed recall (14 percentile)               Low  Average  Daily living memory immediate recall (12 percentile)   Low Average  Daily living memory delayed recall (1 percentile)               Severely Impaired    Cognitive Tests of Executive Functioning:     Ability to think flexibly, Trail making B (4 percentile)                      Moderately Below Expected Level  Categories (12 percentile)                Low Average      Intellectual and Basic Cognitive Functioning (WAIS-IV):  ACS Test of pre-morbid functioning reading recognition lower limit estimated WAIS-IV FSIQ score:       Estimated full scale IQ:           114     83 percentile     High Average    Emotional Functioning:  Ms. Casanova's responses produced a valid protocol. There was no evidence to suggest that the patient was motivated to distort her clinical profile. The configuration of the clinical scales suggest an individual with prominent symptoms of distress and dysphoria. She is reporting feeling withdrawn, and isolated from others. She likely sees her situation hopeless, which is likely to impair her judgment and place her at risk for self-harm. Additionally, Ms. Casanova is reporting a discomforting level of anxiety and tension as well as a depressive mood. IMPRESSIONS:  Mrs. Bety White was assessed across five cognitive domains that included attention, memory, visuospatial, language, and reasoning ability. She is currently demonstrating a significant decline in her visuomotor speed and information processing efficiency of complex and simple information. Likewise, her ability to copy a complex figure with a pencil is severely impaired, although she was able to perceive and construct figure designs with 2-D tiles without difficulty. This again suggests more of a visuoconstructive deficit, rather than a visual perceptual problem. Her performance of a visual discrimination task was in the average range. Her language ability was within expectations. On measures of learning and memory, she is mildly below expectations on this index. Specifically, learning and verbal recall of a word list was mildly below expectations. Recall of paragraph length information was reduced, but in the low average range. Her visual recall was in the average range. On measures of executive functioning, specifically cognitive flexibility was moderately impaired, with low average reasoning ability.  The implications of the findings are that she will likely have reduced ability to process verbal and visual information efficiently. Her ability to shift between mental stimuli will be slow and prone to error. Although there is a decline in her memory she is likely to benefit from mnemonic aids and prompting. In general, findings are consistent with radiological studies. DIAGNOSTIC IMPRESSIONS:    ICD-10-CM ICD-9-CM    1. Mild neurocognitive disorder G31.84 331.83        RECOMMENDATIONS:   · When planning daily activities, consider the following:  · Focus on abilities rather than on limitations as much as possible. Caregiver should give honest praise and encouragement. · A predictable routine is important. It decreases anxiety and reduce his need to adapt to change  · Break down tasks into simple steps. Provide one step at a time instructions or cues and use short words and simple sentences  · Except that each daily task will gradually take more time to do as the disease progresses  · Encourage activities that have little risk of failure. Give credit for trying rather than for completing a task  · Reduce distractions such as background noise to improve the ability to concentrate on task  · Allow the person to make choices to improve self-esteem. Confusion, anxiety, or agitation in response to a choice or clues that making that particular decision may be too difficult  · People with dementia are highly sensitive to the moods of people around him. Caregiver should try to remain calm and patient and avoid criticism. · Early fatigue and short attention span are common in individuals with neurocognitive disorders. When possible, plan activities at the best time of day and allow time for rest.       Thank you for allowing me the opportunity to assist you in Ms. Mccain's care. Please do not hesitate to contact me should you have additional questions that I may not have addressed.     92377 x 1  96138 x 1  00705 x 6  96132 x 1  27302 x 2    David Santoyo, PhD, ABPP  Neuropsychologist  Board Certified Rehabilitation Psychologist  Licensed Clinical Psychologist        Time Documentation:     37094 x 1: Neurobehavioral Status Exam/Clinical Interview: (1 hour, (already billed on first date of service)     58609*4 Neuropsych testing/data gathering by Neuropsychologist (35 additional minutes, see above)      96138 x 1  96139 x 6 Test Administration/Data Gathering By Technician: (3.5 hours). 50119 x 1 (first 30 minutes), 38300 x 7 (each additional 30 minutes)     96132 x 1  96133 x 1 Testing Evaluation Services by Neuropsychologist (1 hour, 50 minutes) 96132 x 1 (first hour), 96133 x 1 (50 minutes)     Definitions:       87165/34673:  Neurobehavioral Status Exam, Clinical interview. Clinical assessment of thinking, reasoning and judgment, by neuropsychologist, both face to face time with patient and time interpreting those test results and reporting, first and subsequent hours)     79230/61187: Neuropsychological Test Administration by Technician/Psychometrist, first 30 minutes and each additional 30 minutes. The above includes: Record review. Review of history provided by patient. Review of collaborative information. Testing by Clinician. Review of raw data. Scoring. Report writing of individual tests administered by Clinician. Integration of individual tests administered by psychometrist with NSE/testing by clinician, review of records/history/collaborative information, case Conceptualization, treatment planning, clinical decision making, report writing, coordination Of Care.  Psychometry test codes as time spent by psychometrist administering and scoring neurocognitive/psychological tests under supervision of neuropsychologist.  Integral services including scoring of raw data, data interpretation, case conceptualization, report writing etcetera were initiated after the patient finished testing/raw data collected and was completed on the date the report was signed. This note will not be viewable in [address]

## 2019-05-01 NOTE — TELEPHONE ENCOUNTER
Reviewed testing with Dr Tianna Quevedo-  - has some deficits and advises dmv driving test  - will await formal report  - will discuss with [facility]

## 2019-05-01 NOTE — TELEPHONE ENCOUNTER
Called Dr Leah Harris's office - spoke with [facility]. Requesting to speak with Dr Debra Gracia nurse. She states he does not have a nurse but a tech that does his testing. Asked if I could speak with her? She was with a pt as well as Dr Nash Post. Advised her this pt was seen on 4/29/19 for neuro psych testing for [facility] form to be completed - for pt to get her licence back. Noted there is nothing in [facility] as far as appt on 4/29/19. Dr Namita Villa needs results or note from Dr Nash Post stating testing done showing pt is competent to drive. This needs to be completed by this afternoon. Gave her our back line number [phone] to have either tech or Dr Nash Post to call us in regards to this. She states she would give them the message.  Will forward to MD.

## 2019-05-04 NOTE — PROGRESS NOTES
HISTORY OF PRESENT ILLNESS  Desmond Cardenas is a 63 y.o. female. HPI Subjective:  Cale Cohen is seen today to review recent findings on her neuropsychological screening. I have discussed her case with Dr. Kya Heaton, who performed her neuropsychological testing. The full report is not available. In our discussion, he felt she had definite difficulties with concentration and labile emotions with anxiety and depression. He advised to have her retested by [facility] for driving safety. The DMV had required the neuropsychological evaluation due to an incident they describe in a letter from the 1st of April. I reviewed these findings with Cale Cohen. She is understandably upset as she has had multiple problems with the DMV in terms of her seizure disorder and other episodes. I related to her that I thought it was most appropriate to be retested to assure that she was safe to drive. She will contact the [facility] and I will submit the appropriate papers. We counseled for 15 minutes regarding anxiety/depression, along with seizure disorder. She has been stable from the latter. 15 minute visit overall, greater than 50% of the visit spent in counseling. I did advise her to continue following up with her counselor and psychiatrist.        Review of Systems   Psychiatric/Behavioral: Positive for depression. The patient is nervous/anxious. Physical Exam   Constitutional: She appears distressed. Tearful, but alert and not agitated    Nursing note and vitals reviewed. ASSESSMENT and PLAN  Diagnoses and all orders for this visit:    1. Seizure disorder ([facility] [address]- Continue current regimen of prescription and / or OTC medications     2.  Recurrent depression ([facility] [address]- See psychologist as discussed/directed , See psychiatrist as directed

## 2019-05-06 NOTE — PROGRESS NOTES
Ladonna Garcia is a 63 y.o. female who presents today for feedback following recent neuropsychological testing. Patient was seen to review her report and was given a copy of the report. Patient was tearful during the feedback session. All questions were addressed.               94832 45 minutes x 1    Won Dueñas, PHD  Neuropsychologist  Board Certified Rehabilitation Psychology

## 2019-05-14 NOTE — TELEPHONE ENCOUNTER
Paul Baker (Self) [phone] (H)     Pt is requesting a call back regarding a form dr Dilan Hunter was suppose to send to dm for her. Also some information from dr Ron Alan.

## 2019-05-14 NOTE — TELEPHONE ENCOUNTER
Received call back from pt. She said she wanted to talk with  as she feels DX was not done correctly. Tried to explain that provider has tested multiple pt's and is a good provider and report was sent to referring Dr's office with his updates. Explained that Dr. Mauricio Mckinney reviewed everything and both he and Dr. Maureen Maldonado are in agreement. Pt seems to be upset that license was suspended. After call I call Dr. Gerard Murrieta office and spoke with his nurse who said she will reach out to pt today and advise of updates.

## 2019-05-14 NOTE — TELEPHONE ENCOUNTER
Spoke with pt - states she is very upset about losing her license. The forms were filled out wrong. She is safe to drive. She is not sure why Dr Kelechi Frye sent her to Dr Michelle Churchill for evaluation? Advised her it was DMV's request not his. She wants to know how this can be fixed? Advised her by retesting through [facility] for driving safety. She states she has taking a driving safety class 2 weeks ago. Advised her to take that to [facility] but I believe they want you to take their driving class. She said no one told her she wolud have to do this driving test. Advised her that Dr Kelechi Frye discussed this with her at her appt on 5/1/19. She said he did not and did not want to talk to me anymore. Will forward to MD. Miri Buenrostro with Dr Hamilton Belt office called me prior to calling pt. She said pt had called her today upset and complaining about forms and losing her license also.

## 2019-05-14 NOTE — TELEPHONE ENCOUNTER
Called pt back.  I advised I think she needs to speak with referring Dr. Nathanael Guzmán as we faxed neuropsych results to him on 05/06/19

## 2019-05-14 NOTE — TELEPHONE ENCOUNTER
----- Message from Sb Camilo sent at 5/14/2019  1:41 PM EDT -----  Regarding: Dr. Keisha Rollins  Pt is calling to speak with the nurse regarding the incorrect paperwork from [facility]   from a month ago. [phone]. Pt stated her license is now suspended. Please fax paperwork to number below.           [phone]

## 2019-05-20 NOTE — TELEPHONE ENCOUNTER
Pt called back and requested I call DMV and speak with their medical director. They told her form was filled out incorrectly. Before she can do her driving test this needs to be corrected. Given me the number to call [phone]. Advised her I would call them tomorrow.  Will forward to MD.

## 2019-05-21 NOTE — TELEPHONE ENCOUNTER
Called DMV - christel Mantilla in regards to pt's customer medical report forms. She advised me that forms E and F were not complete. Also she needs pt's completed neuropsychological assessment. Once addendums are done on forms fax them along with final report. MD has completed and initialed E and F forms. Printed evaluation done by Dr Valverde Person. Faxed full completed form to  Emily Connolly [phone]. Confirmation received. Complete form sent to [employer].

## 2019-05-21 NOTE — TELEPHONE ENCOUNTER
Patient states she called DMV after talking to [facility] yesterday and they said they need to speak with the doctor's office, not her.

## 2019-05-29 NOTE — TELEPHONE ENCOUNTER
Patient called very upset, says she's still getting the run around from [facility] about her 's license. She has tried to do everything to get her license back and is caught in the middle. Asked if Eryn Lagos would please call her back.

## 2019-05-29 NOTE — TELEPHONE ENCOUNTER
Advised pt her forms were re-faxed back to [facility] on 5/22/19. She states DMV is telling her she has to take another driving class. She has already done one. She is very frustrated with DMV. Needs her license - has no one to help her. Advised her MD has done all he can as far as MD can do. She needs to follow what DMV recommends.  Will forward to MD.

## 2019-06-05 NOTE — TELEPHONE ENCOUNTER
Aveda with [employer] returned call. She will fax form for MD to fill out and fax back to her. Received. Will put in MD's folder to review and sign.  Will forward to MD.

## 2019-06-05 NOTE — TELEPHONE ENCOUNTER
Spoke with pt in regards to her call about firing MD. She states she does not want to fire us until we fix what we did. Advised her I have form for [employer]. Once MD completes will fax to them. Form on MD's desk to review.  Will forward to MD.

## 2019-06-05 NOTE — TELEPHONE ENCOUNTER
[id number]  You have made a mistake on filling out a form . Now she is not able to drive and she said you are fired as her DR.  She said you have really messed her up for 6 months

## 2019-06-05 NOTE — TELEPHONE ENCOUNTER
Spoke with pt - states we need to fax over order for her to have driving test done. Advised her she had told me she had already had driving test done. She said she has to do this driving rehab because of the forms MD filled out for DMV. Asked her to give me # for Imani Garvey with [employer] so I can see exactly what she needs - [phone]. Called Yasmeen with [employer] [facility] - left detailed message in regards to this pt - asked to call me back.

## 2019-06-06 NOTE — TELEPHONE ENCOUNTER
Left detailed message for Yasmeen with [employer] that I received her form for MD to fill out and fax back to her. However she is requesting medical summary which pt needs to sign release of medical records before we can send any type of records. Requesting she call me back when she receives my message.

## 2019-06-07 NOTE — TELEPHONE ENCOUNTER
Advised pt we have faxed her order to [employer] for her driving evaluation. But they are requesting medical records such as her med list and medical summary. She will need to come by to sign a release of medical records for us to do this. She said she gives me her approval to do so. I told her due to HIPAA she has to sign to release records - this is for her protections.  Will forward to MD.

## 2019-06-07 NOTE — PROGRESS NOTES
Order for [facility] evaluation. Fax to ATTNGivanna Mccauley [phone]. Confirmation received.

## 2019-06-13 NOTE — TELEPHONE ENCOUNTER
Spoke with pt - she is not happy MD has fired her. She would like him to stay with her until she gets her drivers license back. Feels since he and Dr Molly Bhatia filled out DMV forms and caused her not to be able to get it back. Advised her Dr Pablo Lyle has gone above to help her - has even spoke to [facility] himself. We have faxed forms to [facility] for her to get appt and be tested. She has appt Monday 6/17/19. States she has 2 more test after that to complete.  Will forward to MD.

## 2019-06-13 NOTE — TELEPHONE ENCOUNTER
Pt calling to talk with nurse/ due to Letter firing her. States she is homebound. Wants Dr to see it through to get her license back!   [phone]

## 2019-06-18 NOTE — TELEPHONE ENCOUNTER
Confirmed with provider that pt can schedule a 2nd f/u, called pt and was able to get apt for 07/05/19

## 2020-09-17 NOTE — TELEPHONE ENCOUNTER
----- Message from Zachery Crigler, LPN sent at 80/7/4567 12:38 PM EDT -----  Regarding: FW: acs      ----- Message -----     From: Marialuisa Prabhakar RN     Sent: 10/2/2018  11:41 AM       To: Sabi Harmon RN, Zachery Crigler, LPN  Subject: RE: acs                                          There was no mention of driving restriction in their notes. Neuro was not consulted. It looks like they think it was related to not taking medications(she missed 3 days of Keppra) as directed along with unintentional OD- positive for benzodiazepines, barbiturates, and amphetamines in [place]. Her [facility] psych told [address] [place] [address] Psych that he is not prescribing pt any amphetamines but is prescribing clonazepam and fiorcet. It states to resume all activity. I was not able to reach the discharging hospitalist, but I suspect that he would have a difficult time remembering if he told her not to drive since it was not written in any notes. ----- Message -----     From: Zachery Crigler, LPN     Sent: 39/7/0763  12:40 PM       To: Sabi Harmon RN, Feroz Padgett RN  Subject: Nicole Ackerman: naheed                                          When I asked Dr Frantz Austin about this he said he meant to send to . Thank you. Patrick Montanez  ----- Message -----     From: Ronn Urena MD     Sent: 9/27/2018   6:30 PM       To:  [facility] Pool  Subject: acs                                              Gabe watters- please research recent hospital records and contact hospitalists if needed, regarding whether she was given any driving restriction or DMV was contacted      10 1 18- 7 PM, called pt- no answer  10 2 18- 8 AM, called pt no answer, message left that I'd call her tonight Consent: The patient's consent was obtained including but not limited to risks of crusting, scabbing, blistering, scarring, darker or lighter pigmentary change, recurrence, incomplete removal and infection. Include Z78.9 (Other Specified Conditions Influencing Health Status) As An Associated Diagnosis?: No Render Post-Care Instructions In Note?: yes Detail Level: Detailed Post-Care Instructions: I reviewed with the patient in detail post-care instructions. Patient is to wear sunprotection, and avoid picking at any of the treated lesions. Pt may apply Vaseline to crusted or scabbing areas. Call if not resolved in 6 weeks Medical Necessity Clause: This procedure was medically necessary because the lesions that were treated were: Medical Necessity Information: It is in your best interest to select a reason for this procedure from the list below. All of these items fulfill various CMS LCD requirements except the new and changing color options. Number Of Freeze-Thaw Cycles: 1 freeze-thaw cycle Duration Of Freeze Thaw-Cycle (Seconds): 0 Post-Care Instructions: I reviewed with the patient in detail post-care instructions. Patient is to wear sunprotection, and avoid picking at any of the treated lesions. Pt may apply Vaseline to crusted or scabbing areas. Call if not resolved in 6 weeks.